# Patient Record
Sex: FEMALE | Race: WHITE | NOT HISPANIC OR LATINO | Employment: PART TIME | URBAN - METROPOLITAN AREA
[De-identification: names, ages, dates, MRNs, and addresses within clinical notes are randomized per-mention and may not be internally consistent; named-entity substitution may affect disease eponyms.]

---

## 2020-07-05 ENCOUNTER — HISTORICAL (OUTPATIENT)
Dept: ADMINISTRATIVE | Facility: HOSPITAL | Age: 40
End: 2020-07-05

## 2020-07-05 LAB — STREP A PCR (OHS): NOT DETECTED

## 2020-09-22 ENCOUNTER — HISTORICAL (OUTPATIENT)
Dept: ADMINISTRATIVE | Facility: HOSPITAL | Age: 40
End: 2020-09-22

## 2020-09-22 LAB
FLUAV AG UPPER RESP QL IA.RAPID: NEGATIVE
FLUBV AG UPPER RESP QL IA.RAPID: NEGATIVE

## 2021-03-30 LAB — SARS-COV-2 RNA RESP QL NAA+PROBE: NEGATIVE

## 2021-06-04 ENCOUNTER — HISTORICAL (OUTPATIENT)
Dept: RADIOLOGY | Facility: HOSPITAL | Age: 41
End: 2021-06-04

## 2021-11-20 ENCOUNTER — HISTORICAL (OUTPATIENT)
Dept: ADMINISTRATIVE | Facility: HOSPITAL | Age: 41
End: 2021-11-20

## 2022-02-04 ENCOUNTER — HISTORICAL (OUTPATIENT)
Dept: ADMINISTRATIVE | Facility: HOSPITAL | Age: 42
End: 2022-02-04

## 2022-04-11 ENCOUNTER — HISTORICAL (OUTPATIENT)
Dept: ADMINISTRATIVE | Facility: HOSPITAL | Age: 42
End: 2022-04-11
Payer: MEDICAID

## 2022-04-27 VITALS
BODY MASS INDEX: 45.99 KG/M2 | DIASTOLIC BLOOD PRESSURE: 97 MMHG | OXYGEN SATURATION: 97 % | WEIGHT: 293 LBS | SYSTOLIC BLOOD PRESSURE: 140 MMHG | HEIGHT: 67 IN

## 2022-06-06 ENCOUNTER — HOSPITAL ENCOUNTER (OUTPATIENT)
Dept: RADIOLOGY | Facility: HOSPITAL | Age: 42
Discharge: HOME | End: 2022-06-06
Attending: NURSE PRACTITIONER
Payer: MEDICAID

## 2022-06-06 DIAGNOSIS — Z12.31 ENCOUNTER FOR SCREENING MAMMOGRAM FOR BREAST CANCER: ICD-10-CM

## 2022-06-06 PROCEDURE — 77063 MAMMO DIGITAL SCREENING BILAT WITH TOMO: ICD-10-PCS | Mod: 26,,, | Performed by: RADIOLOGY

## 2022-06-06 PROCEDURE — 77067 MAMMO DIGITAL SCREENING BILAT WITH TOMO: ICD-10-PCS | Mod: 26,,, | Performed by: RADIOLOGY

## 2022-06-06 PROCEDURE — 77063 BREAST TOMOSYNTHESIS BI: CPT | Mod: 26,,, | Performed by: RADIOLOGY

## 2022-06-06 PROCEDURE — 77067 SCR MAMMO BI INCL CAD: CPT | Mod: TC

## 2022-06-06 PROCEDURE — 77067 SCR MAMMO BI INCL CAD: CPT | Mod: 26,,, | Performed by: RADIOLOGY

## 2022-06-21 ENCOUNTER — CLINICAL SUPPORT (OUTPATIENT)
Dept: AUDIOLOGY | Facility: HOSPITAL | Age: 42
End: 2022-06-21
Payer: MEDICAID

## 2022-06-21 DIAGNOSIS — H93.13 TINNITUS, BILATERAL: ICD-10-CM

## 2022-06-21 DIAGNOSIS — H90.3 SENSORINEURAL HEARING LOSS, BILATERAL: Primary | ICD-10-CM

## 2022-06-21 PROCEDURE — 92567 TYMPANOMETRY: CPT | Performed by: AUDIOLOGIST

## 2022-06-21 PROCEDURE — 92557 COMPREHENSIVE HEARING TEST: CPT | Performed by: AUDIOLOGIST

## 2022-06-21 NOTE — PROGRESS NOTES
Hearing Evaluation        Patient History: Ms. Saldaña reports a gradual decrease in hearing, onset about a year ago. She also reports transient bilateral tinnitus and a history of vestibular migraines.  Middle ear issues are denied at this time.  All additional history is unremarkable.        Test Results:                    Pure Tone Testing:      Right ear:       Mild to moderately severe sensorineural hearing loss from 250-8kHz. Speech reception threshold is in agreement with puretone findings.  Discrimination score of 92% is considered excellent.        Left ear:          Mild to moderate sensorineural hearing loss from 250-8kHz. Speech reception threshold is in agreement with puretone findings.  Discrimination score of 92% is considered excellent.                                                                            Tympanometry:                                           Right ear:       Type 'A' tymp, normal middle ear pressure/function     Left ear:          Type 'A' tymp, normal middle ear pressure/function                                           Interpretations:      Behavioral test findings indicate a mild to moderate SNHL, AS and mild to moderately severe SNHL, AD. Speech reception thresholds obtained at 40dB, AD and 35dB, AS, and are in agreement with puretone findings. Speech discrimination scores of 92%, AU, are considered excellent.  Immittance measures indicate normal middle ear pressure/function, bilaterally.            Recommendations: Annual hearing evaluations are recommended to monitor for progression.  She is not a hearing aid candidate for [place] due to age restriction.

## 2022-09-22 ENCOUNTER — HISTORICAL (OUTPATIENT)
Dept: ADMINISTRATIVE | Facility: HOSPITAL | Age: 42
End: 2022-09-22
Payer: MEDICAID

## 2023-04-02 ENCOUNTER — HOSPITAL ENCOUNTER (EMERGENCY)
Facility: HOSPITAL | Age: 43
Discharge: HOME | End: 2023-04-02
Attending: STUDENT IN AN ORGANIZED HEALTH CARE EDUCATION/TRAINING PROGRAM
Payer: MEDICAID

## 2023-04-02 VITALS
WEIGHT: 293 LBS | OXYGEN SATURATION: 97 % | HEART RATE: 74 BPM | BODY MASS INDEX: 51.8 KG/M2 | RESPIRATION RATE: 12 BRPM | DIASTOLIC BLOOD PRESSURE: 84 MMHG | TEMPERATURE: 99 F | SYSTOLIC BLOOD PRESSURE: 151 MMHG

## 2023-04-02 DIAGNOSIS — K57.92 DIVERTICULITIS: Primary | ICD-10-CM

## 2023-04-02 DIAGNOSIS — R10.32 LEFT LOWER QUADRANT ABDOMINAL PAIN: ICD-10-CM

## 2023-04-02 LAB
ALBUMIN SERPL-MCNC: 3.7 G/DL (ref 3.5–5)
ALBUMIN/GLOB SERPL: 1.2 RATIO (ref 1.1–2)
ALP SERPL-CCNC: 53 UNIT/L (ref 40–150)
ALT SERPL-CCNC: 20 UNIT/L (ref 0–55)
APPEARANCE UR: CLEAR
AST SERPL-CCNC: 16 UNIT/L (ref 5–34)
B-HCG SERPL QL: NEGATIVE
BACTERIA #/AREA URNS AUTO: NORMAL /HPF
BASOPHILS # BLD AUTO: 0.04 X10(3)/MCL (ref 0–0.2)
BASOPHILS NFR BLD AUTO: 0.5 %
BILIRUB UR QL STRIP.AUTO: NEGATIVE MG/DL
BILIRUBIN DIRECT+TOT PNL SERPL-MCNC: 0.7 MG/DL
BUN SERPL-MCNC: 9.6 MG/DL (ref 7–18.7)
CALCIUM SERPL-MCNC: 9.7 MG/DL (ref 8.4–10.2)
CHLORIDE SERPL-SCNC: 109 MMOL/L (ref 98–107)
CO2 SERPL-SCNC: 25 MMOL/L (ref 22–29)
COLOR UR AUTO: YELLOW
CREAT SERPL-MCNC: 0.76 MG/DL (ref 0.55–1.02)
EOSINOPHIL # BLD AUTO: 0.42 X10(3)/MCL (ref 0–0.9)
EOSINOPHIL NFR BLD AUTO: 4.8 %
ERYTHROCYTE [DISTWIDTH] IN BLOOD BY AUTOMATED COUNT: 13 % (ref 11.5–17)
GFR SERPLBLD CREATININE-BSD FMLA CKD-EPI: >60 MLS/MIN/1.73/M2
GLOBULIN SER-MCNC: 3.2 GM/DL (ref 2.4–3.5)
GLUCOSE SERPL-MCNC: 93 MG/DL (ref 74–100)
GLUCOSE UR QL STRIP.AUTO: NEGATIVE MG/DL
HCT VFR BLD AUTO: 45.1 % (ref 37–47)
HGB BLD-MCNC: 14.8 G/DL (ref 12–16)
IMM GRANULOCYTES # BLD AUTO: 0.03 X10(3)/MCL (ref 0–0.04)
IMM GRANULOCYTES NFR BLD AUTO: 0.3 %
KETONES UR QL STRIP.AUTO: NEGATIVE MG/DL
LEUKOCYTE ESTERASE UR QL STRIP.AUTO: NEGATIVE UNIT/L
LIPASE SERPL-CCNC: 10 U/L
LYMPHOCYTES # BLD AUTO: 3.14 X10(3)/MCL (ref 0.6–4.6)
LYMPHOCYTES NFR BLD AUTO: 36 %
MCH RBC QN AUTO: 29.4 PG (ref 27–31)
MCHC RBC AUTO-ENTMCNC: 32.8 G/DL (ref 33–36)
MCV RBC AUTO: 89.7 FL (ref 80–94)
MONOCYTES # BLD AUTO: 0.58 X10(3)/MCL (ref 0.1–1.3)
MONOCYTES NFR BLD AUTO: 6.7 %
NEUTROPHILS # BLD AUTO: 4.51 X10(3)/MCL (ref 2.1–9.2)
NEUTROPHILS NFR BLD AUTO: 51.7 %
NITRITE UR QL STRIP.AUTO: NEGATIVE
NRBC BLD AUTO-RTO: 0 %
PH UR STRIP.AUTO: 6.5 [PH]
PLATELET # BLD AUTO: 283 X10(3)/MCL (ref 130–400)
PMV BLD AUTO: 9.5 FL (ref 7.4–10.4)
POTASSIUM SERPL-SCNC: 4.3 MMOL/L (ref 3.5–5.1)
PROT SERPL-MCNC: 6.9 GM/DL (ref 6.4–8.3)
PROT UR QL STRIP.AUTO: NEGATIVE MG/DL
RBC # BLD AUTO: 5.03 X10(6)/MCL (ref 4.2–5.4)
RBC #/AREA URNS AUTO: <5 /HPF
RBC UR QL AUTO: NEGATIVE UNIT/L
SODIUM SERPL-SCNC: 139 MMOL/L (ref 136–145)
SP GR UR STRIP.AUTO: 1.02 (ref 1–1.03)
SQUAMOUS #/AREA URNS AUTO: <5 /HPF
UROBILINOGEN UR STRIP-ACNC: 1 MG/DL
WBC # SPEC AUTO: 8.7 X10(3)/MCL (ref 4.5–11.5)
WBC #/AREA URNS AUTO: <5 /HPF

## 2023-04-02 PROCEDURE — 81001 URINALYSIS AUTO W/SCOPE: CPT | Performed by: NURSE PRACTITIONER

## 2023-04-02 PROCEDURE — 80053 COMPREHEN METABOLIC PANEL: CPT | Performed by: NURSE PRACTITIONER

## 2023-04-02 PROCEDURE — 25000003 PHARM REV CODE 250: Performed by: STUDENT IN AN ORGANIZED HEALTH CARE EDUCATION/TRAINING PROGRAM

## 2023-04-02 PROCEDURE — 25500020 PHARM REV CODE 255: Performed by: STUDENT IN AN ORGANIZED HEALTH CARE EDUCATION/TRAINING PROGRAM

## 2023-04-02 PROCEDURE — 96360 HYDRATION IV INFUSION INIT: CPT | Mod: 59

## 2023-04-02 PROCEDURE — 99285 EMERGENCY DEPT VISIT HI MDM: CPT | Mod: 25

## 2023-04-02 PROCEDURE — 63600175 PHARM REV CODE 636 W HCPCS: Performed by: STUDENT IN AN ORGANIZED HEALTH CARE EDUCATION/TRAINING PROGRAM

## 2023-04-02 PROCEDURE — 83690 ASSAY OF LIPASE: CPT | Performed by: NURSE PRACTITIONER

## 2023-04-02 PROCEDURE — 81025 URINE PREGNANCY TEST: CPT | Performed by: NURSE PRACTITIONER

## 2023-04-02 PROCEDURE — 85025 COMPLETE CBC W/AUTO DIFF WBC: CPT | Performed by: NURSE PRACTITIONER

## 2023-04-02 RX ORDER — AMOXICILLIN AND CLAVULANATE POTASSIUM 875; 125 MG/1; MG/1
1 TABLET, FILM COATED ORAL 2 TIMES DAILY
Qty: 14 TABLET | Refills: 0 | Status: SHIPPED | OUTPATIENT
Start: 2023-04-02

## 2023-04-02 RX ORDER — HYDROCODONE BITARTRATE AND ACETAMINOPHEN 5; 325 MG/1; MG/1
1 TABLET ORAL EVERY 8 HOURS PRN
Qty: 13 TABLET | Refills: 0 | Status: SHIPPED | OUTPATIENT
Start: 2023-04-02

## 2023-04-02 RX ORDER — ONDANSETRON 4 MG/1
4 TABLET, ORALLY DISINTEGRATING ORAL
Status: COMPLETED | OUTPATIENT
Start: 2023-04-02 | End: 2023-04-02

## 2023-04-02 RX ORDER — AMOXICILLIN AND CLAVULANATE POTASSIUM 875; 125 MG/1; MG/1
1 TABLET, FILM COATED ORAL
Status: COMPLETED | OUTPATIENT
Start: 2023-04-02 | End: 2023-04-02

## 2023-04-02 RX ORDER — HYDROCODONE BITARTRATE AND ACETAMINOPHEN 5; 325 MG/1; MG/1
1 TABLET ORAL
Status: COMPLETED | OUTPATIENT
Start: 2023-04-02 | End: 2023-04-02

## 2023-04-02 RX ORDER — ONDANSETRON 4 MG/1
4 TABLET, ORALLY DISINTEGRATING ORAL EVERY 8 HOURS PRN
Qty: 12 TABLET | Refills: 0 | Status: SHIPPED | OUTPATIENT
Start: 2023-04-02

## 2023-04-02 RX ADMIN — AMOXICILLIN AND CLAVULANATE POTASSIUM 1 TABLET: 875; 125 TABLET, FILM COATED ORAL at 07:04

## 2023-04-02 RX ADMIN — HYDROCODONE BITARTRATE AND ACETAMINOPHEN 1 TABLET: 5; 325 TABLET ORAL at 07:04

## 2023-04-02 RX ADMIN — SODIUM CHLORIDE, POTASSIUM CHLORIDE, SODIUM LACTATE AND CALCIUM CHLORIDE 1000 ML: 600; 310; 30; 20 INJECTION, SOLUTION INTRAVENOUS at 04:04

## 2023-04-02 RX ADMIN — ONDANSETRON 4 MG: 4 TABLET, ORALLY DISINTEGRATING ORAL at 07:04

## 2023-04-02 RX ADMIN — IOPAMIDOL 100 ML: 755 INJECTION, SOLUTION INTRAVENOUS at 04:04

## 2023-04-02 NOTE — FIRST PROVIDER EVALUATION
Medical screening examination initiated.  I have conducted a focused provider triage encounter, findings are as follows:    Brief history of present illness:  Patient states left lower abdominal pain and nausea.     Vitals:    04/02/23 1325 04/02/23 1326   BP:  129/71   Pulse: 86    Resp: 16    Temp: 98.7 °F (37.1 °C)    TempSrc: Oral    SpO2: 97%    Weight: (!) 149.7 kg (330 lb)        Pertinent physical exam:  Awake, alert, ambulatory      Brief workup plan:  LAbs    Preliminary workup initiated; this workup will be continued and followed by the physician or advanced practice provider that is assigned to the patient when roomed.

## 2023-04-02 NOTE — ED NOTES
Pt with no s/s of resp or other distress noted; pt attached to all monitors; will continue to monitor

## 2023-04-02 NOTE — ED PROVIDER NOTES
Encounter Date: 4/2/2023    SCRIBE #1 NOTE: I, Deanna Belcher, am scribing for, and in the presence of,  Saul Smith MD. I have scribed the following portions of the note - Other sections scribed: HPI, ROS, PE.     History     Chief Complaint   Patient presents with    Abdominal Pain    Nausea     Pt presents c/o LLQ abd pain with nausea. Onset x 3 days.  PMH diverticulitis.       41 y/o female with hx of hysterectomy, diverticulitis, and lupus presents to the ED c/o LLQ abdominal pain and nausea that onset 3 days ago. Pt reports her as abdominal pain as sporadic and random and does not radiate. Pt reports the pain occurs sometimes when moving. Pt reports not being able to sleep last night due to pain. Pt reports this pain has occurred before, patient was diagnosed with diverticulitis at that time and was discharged with antibiotics. Note pt [facility] and [facility] referred her to come here. Pt reports flatulence and normal bowel movements. Pt denies blood in stool, fever, chills, chest pain, vomiting, and constipation.     The history is provided by the patient and medical records. No  was used.   Abdominal Pain  The current episode started several days ago (3 days.). The onset of the illness was gradual. The problem has not changed since onset.The abdominal pain is located in the LLQ. The abdominal pain does not radiate. The abdominal pain is relieved by movement and certain positions. The other symptoms of the illness include nausea. The other symptoms of the illness do not include fever or vomiting.   Nausea began 3 to 5 days ago. The nausea is exacerbated by motion and activity.   The patient states that she believes she is currently not pregnant. The patient has not had a change in bowel habit. Symptoms associated with the illness do not include chills. Significant associated medical issues include diverticulitis.   Review of patient's allergies indicates:   Allergen  Reactions    Latex, natural rubber Hives, Itching and Rash    Codeine     Tramadol      Other reaction(s): Not Indicated     No past medical history on file.  No past surgical history on file.  No family history on file.     Review of Systems   Constitutional:  Negative for chills and fever.   Gastrointestinal:  Positive for abdominal pain and nausea. Negative for vomiting.        Flatulence. Normal bowel movements.      Physical Exam     Initial Vitals   BP Pulse Resp Temp SpO2   04/02/23 1326 04/02/23 1325 04/02/23 1325 04/02/23 1325 04/02/23 1325   129/71 86 16 98.7 °F (37.1 °C) 97 %      MAP       --                Physical Exam    Nursing note and vitals reviewed.  Constitutional: She appears well-developed and well-nourished. She is not diaphoretic. No distress.   Limited by body habitus.    HENT:   Head: Normocephalic and atraumatic.   Right Ear: External ear normal.   Left Ear: External ear normal.   Nose: Nose normal.   Eyes: Conjunctivae and EOM are normal. Pupils are equal, round, and reactive to light. Right eye exhibits no discharge. Left eye exhibits no discharge.   Cardiovascular:  Normal rate, regular rhythm, normal heart sounds and intact distal pulses.     Exam reveals no gallop and no friction rub.       No murmur heard.  Pulmonary/Chest: Breath sounds normal. No respiratory distress. She has no wheezes. She has no rhonchi. She has no rales. She exhibits no tenderness.   Abdominal: Abdomen is soft. Bowel sounds are normal. She exhibits no distension and no mass. There is abdominal tenderness (left lower quadrant). There is no rebound and no guarding.   Musculoskeletal:         General: No edema. Normal range of motion.     Neurological: She is alert and oriented to person, place, and time. No cranial nerve deficit or sensory deficit.   Skin: Skin is warm and dry. Capillary refill takes less than 2 seconds. No erythema. No pallor.       ED Course   Procedures  Labs Reviewed   COMPREHENSIVE  METABOLIC PANEL - Abnormal; Notable for the following components:       Result Value    Chloride 109 (*)     All other components within normal limits   CBC WITH DIFFERENTIAL - Abnormal; Notable for the following components:    MCHC 32.8 (*)     All other components within normal limits   HCG QUALITATIVE URINE - Normal   URINALYSIS, REFLEX TO URINE CULTURE - Normal   LIPASE - Normal   URINALYSIS, MICROSCOPIC - Normal   CBC W/ AUTO DIFFERENTIAL    Narrative:     The following orders were created for panel order CBC Auto Differential.  Procedure                               Abnormality         Status                     ---------                               -----------         ------                     CBC with Differential[[id number]]        Abnormal            Final result                 Please view results for these tests on the individual orders.          Imaging Results              CT Abdomen Pelvis With Contrast (Final result)  Result time 04/02/23 16:48:20      Final result by George De Leon MD (04/02/23 16:48:20)                   Impression:      Diverticulitis descending colon with no gross perforation or drainable abscess.      Electronically signed by: George De Leon  Date:    04/02/2023  Time:    16:48               Narrative:    EXAMINATION:  CT ABDOMEN PELVIS WITH CONTRAST    CLINICAL HISTORY:  LLQ abdominal pain;    TECHNIQUE:  Helical acquisition through the abdomen and pelvis with IV contrast.  Three plane reconstructions were provided for review. DLP 1936 mGycm. Automatic exposure control, adjustment of mA/kV or iterative reconstruction technique was used to reduce radiation.    COMPARISON:  6 December 2021    FINDINGS:  The limited imaged lung bases are clear.    There is hepatic steatosis.  No significant abnormality gallbladder, spleen, pancreas or adrenals.  No hydronephrosis or suspicious renal lesion.    No bowel obstruction.  There is colonic diverticulosis with inflammatory changes along  the descending colon.  There is no gross perforation or drainable abscess.    Urinary bladder unremarkable.  No pelvic free fluid.  Uterus surgically absent.  Abdominal aorta normal in caliber.    Mild degenerative change of the spine.                                       Medications   lactated ringers bolus 1,000 mL (0 mLs Intravenous Stopped 4/2/23 1731)   iopamidoL (ISOVUE-370) injection 100 mL (100 mLs Intravenous Given 4/2/23 1635)   HYDROcodone-acetaminophen 5-325 mg per tablet 1 tablet (1 tablet Oral Given 4/2/23 1923)   ondansetron disintegrating tablet 4 mg (4 mg Oral Given 4/2/23 1923)   amoxicillin-clavulanate 875-125mg per tablet 1 tablet (1 tablet Oral Given 4/2/23 1922)                 Medical Decision Making  Patient presents with left lower quadrant pain, history of diverticulitis reports this feels the same.    Differential diagnosis includes but is not limited to appendicitis, biliary disease, diverticulitis,  AAA, ACS, mesenteric ischemia, intraabdominal abcess, retroperitoneal abcess, gastritis, gastroenteritis, hepatitis, hernia, pancreatitis, inflammatory bowel disease, PUD, SBP, nephrolithiasis, DKA, sickle cell crisis, consitpation, GERD, IBS     Patient's labs are unremarkable.  No leukocytosis no significant other abnormality.  She is offered CT or empiric treatment, she would prefer the CT.  CT scan does indeed show once again diverticulitis.  Patient otherwise awake alert requesting discharge home.  Will discharge home with the appropriate pain medications, Augmentin.  Return precautions given.  Questions invited, questions answered to the best my ability.  Patient discharged home condition stable.      Amount and/or Complexity of Data Reviewed  External Data Reviewed: notes.     Details: See ED course  Labs: ordered. Decision-making details documented in ED Course.  Radiology: ordered and independent interpretation performed.     Details: Haziness and stranding in lower abdomen, left  side, concerning for infection, possible diverticulitis.    Risk  Prescription drug management.          Scribe Attestation:   Scribe #1: I performed the above scribed service and the documentation accurately describes the services I performed. I attest to the accuracy of the note.    Attending Attestation:           Physician Attestation for Scribe:  Physician Attestation Statement for Scribe #1: I, Saul Smith MD, reviewed documentation, as scribed by Deanna Belcher in my presence, and it is both accurate and complete.           ED Course as of 04/03/23 0223   Sun Apr 02, 2023   1502 Chart review reveals CT scan from 12/06/2021 showed uncomplicated diverticulitis. [MM]   1600 Comprehensive Metabolic Panel(!)  Mildly elevated chloride 109, no other electrolyte abnormality.  No evidence of renal dysfunction. [MM]   1600 Urinalysis, Reflex to Urine Culture  Negative for urinary tract infection. [MM]   1601 CBC Auto Differential(!)  No leukocytosis no anemia. [MM]   Mon Apr 03, 2023   0223 Chart review reveals visit on 12/06/2021 patient was diagnosed with diverticulitis. [MM]      ED Course User Index  [MM] Saul Smith MD                 Clinical Impression:   Final diagnoses:  [K57.92] Diverticulitis (Primary)  [R10.32] Left lower quadrant abdominal pain        ED Disposition Condition    Discharge Stable          ED Prescriptions       Medication Sig Dispense Start Date End Date Auth. Provider    amoxicillin-clavulanate 875-125mg (AUGMENTIN) 875-125 mg per tablet Take 1 tablet by mouth 2 (two) times daily. 14 tablet 4/2/2023 -- Saul Smith MD    HYDROcodone-acetaminophen (NORCO) 5-325 mg per tablet Take 1 tablet by mouth every 8 (eight) hours as needed for Pain. 13 tablet 4/2/2023 -- Saul Smith MD    ondansetron (ZOFRAN-ODT) 4 MG TbDL Take 1 tablet (4 mg total) by mouth every 8 (eight) hours as needed (nausea vomiting). 12 tablet 4/2/2023 -- Saul Smith MD          Follow-up  Information       Follow up With Specialties Details Why Contact Info    Tj Lares MD Obstetrics, Obstetrics and Gynecology Call   [address] [facility] [id number] [phone]               Saul Smith MD  04/03/23 7204

## 2023-04-02 NOTE — Clinical Note
"Yoli Bansal" Piotr was seen and treated in our emergency department on 4/2/2023.  She may return to work on 04/05/2023.       If you have any questions or concerns, please don't hesitate to call.      Saul Smith MD"

## 2023-04-03 NOTE — DISCHARGE INSTRUCTIONS
Thanks for letting us take care of you today!  It is our goal to give you courteous care and to keep you comfortable and informed, if you have any questions before you leave I will be happy to try and answer them.    Here is some advice after your visit:    Your visit in the emergency department is NOT definitive care - please follow-up with your primary care doctor and/or specialist within 1-2 days. Please return to the emergency department if you develop worsening symptoms including: fever, chills, chest pain, shortness of breath, weakness, numbness, tingling, nausea, vomiting, inability to eat, drink, or take your medication. Please return if you have any worsening in your condition or if you have any other concerns.    If you had radiology exams like an XRAY or CT in the emergency Department the interpreation on them may be preliminary - there may be less time sensitive findings on the reports please obtain these reports within 24 hours from the hospital or by using your out on your mobile phone to access records.  Bring these to your primary care doctor and/or specialist for further review of incidental findings.    Please review any LAB WORK from your visit today with your primary care physician.    Please take all antibiotics as prescribed.      Please return emergency department worsening symptoms, fever, chills, nausea, vomiting, inability to eat, drink, take your medication.

## 2023-11-30 ENCOUNTER — ON-DEMAND VIRTUAL (OUTPATIENT)
Dept: URGENT CARE | Facility: CLINIC | Age: 43
End: 2023-11-30
Payer: MEDICAID

## 2023-11-30 DIAGNOSIS — J01.00 ACUTE NON-RECURRENT MAXILLARY SINUSITIS: Primary | ICD-10-CM

## 2023-11-30 PROCEDURE — 99213 PR OFFICE/OUTPT VISIT, EST, LEVL III, 20-29 MIN: ICD-10-PCS | Mod: 95,,, | Performed by: FAMILY MEDICINE

## 2023-11-30 PROCEDURE — 99213 OFFICE O/P EST LOW 20 MIN: CPT | Mod: 95,,, | Performed by: FAMILY MEDICINE

## 2023-11-30 RX ORDER — AMOXICILLIN AND CLAVULANATE POTASSIUM 875; 125 MG/1; MG/1
1 TABLET, FILM COATED ORAL EVERY 12 HOURS
Qty: 20 TABLET | Refills: 0 | Status: SHIPPED | OUTPATIENT
Start: 2023-11-30 | End: 2023-12-10

## 2023-11-30 NOTE — PROGRESS NOTES
Subjective:      Patient ID: Yoli Saldaña is a 43 y.o. female.    Vitals:  vitals were not taken for this visit.     Chief Complaint: Nasal Congestion (1o days)      Visit Type: TELE AUDIOVISUAL    Present with the patient at the time of consultation: TELEMED PRESENT WITH PATIENT: None    History reviewed. No pertinent past medical history.  History reviewed. No pertinent surgical history.  Review of patient's allergies indicates:   Allergen Reactions    Latex, natural rubber Hives, Itching and Rash    Codeine     Tramadol      Other reaction(s): Not Indicated     Current Outpatient Medications on File Prior to Visit   Medication Sig Dispense Refill    amoxicillin-clavulanate 875-125mg (AUGMENTIN) 875-125 mg per tablet Take 1 tablet by mouth 2 (two) times daily. 14 tablet 0    HYDROcodone-acetaminophen (NORCO) 5-325 mg per tablet Take 1 tablet by mouth every 8 (eight) hours as needed for Pain. 13 tablet 0    ondansetron (ZOFRAN-ODT) 4 MG TbDL Take 1 tablet (4 mg total) by mouth every 8 (eight) hours as needed (nausea vomiting). 12 tablet 0     No current facility-administered medications on file prior to visit.     History reviewed. No pertinent family history.    Medications Ordered                [id number] [address] & [address] [address]    Telephone: [phone]   Fax: [phone]   Hours: Open 24 hours                         E-Prescribed (1 of 1)              amoxicillin-clavulanate 875-125mg (AUGMENTIN) 875-125 mg per tablet    Sig: Take 1 tablet by mouth every 12 (twelve) hours. for 10 days       Start: 11/30/23     Quantity: 20 tablet Refills: 0                           Ohs Peq Odvv Intake    11/30/2023  5:24 PM CST - Filed by Patient   Describe your reason for todays visit Chest and sinus congestion for over a week   What is your current physical address in the event of a medical emergency?    Are  you able to take your vital signs? No   Please attach any relevant images or files          Chest and sinus congestion for over a week.Patient has a history of a sinus infection a few times per year, no sinus surgery, Augmentin 10-day works well for her, currently she is taking Flonase only, no fever.  Discolored nasal discharge and getting worse in past 2 to 3 days, no facial swelling or redness, patient sounds congested        Constitution: Negative for fever.   HENT:  Positive for sinus pain and sinus pressure. Negative for facial swelling.    Respiratory:  Positive for cough.         Objective:   The physical exam was conducted virtually.  Physical Exam   Constitutional: She is oriented to person, place, and time.   HENT:   Head: Normocephalic and atraumatic.   Nose: Rhinorrhea and congestion present. Right sinus exhibits maxillary sinus tenderness and frontal sinus tenderness.   Eyes: Conjunctivae are normal.   Pulmonary/Chest: Effort normal. No respiratory distress.   Abdominal: Normal appearance.   Neurological: no focal deficit. She is alert and oriented to person, place, and time.   Skin: Skin is no rash.   Psychiatric: Mood, judgment and thought content normal.       Assessment:     1. Acute non-recurrent maxillary sinusitis        Plan:       Acute non-recurrent maxillary sinusitis    Other orders  -     amoxicillin-clavulanate 875-125mg (AUGMENTIN) 875-125 mg per tablet; Take 1 tablet by mouth every 12 (twelve) hours. for 10 days  Dispense: 20 tablet; Refill: 0

## 2024-01-03 ENCOUNTER — ON-DEMAND VIRTUAL (OUTPATIENT)
Dept: URGENT CARE | Facility: CLINIC | Age: 44
End: 2024-01-03
Payer: MEDICAID

## 2024-01-03 DIAGNOSIS — B34.9 VIRAL SYNDROME: Primary | ICD-10-CM

## 2024-01-03 PROCEDURE — 99212 OFFICE O/P EST SF 10 MIN: CPT | Mod: 95,,, | Performed by: FAMILY MEDICINE

## 2024-01-03 RX ORDER — OSELTAMIVIR PHOSPHATE 75 MG/1
75 CAPSULE ORAL 2 TIMES DAILY
Qty: 10 CAPSULE | Refills: 0 | Status: SHIPPED | OUTPATIENT
Start: 2024-01-03 | End: 2024-01-08

## 2024-01-03 NOTE — PROGRESS NOTES
Subjective:      Patient ID: Yoli Saldaña is a 43 y.o. female.    Vitals:  vitals were not taken for this visit.     Chief Complaint: Cough (Cough fever)      Visit Type: TELE AUDIOVISUAL    Present with the patient at the time of consultation: TELEMED PRESENT WITH PATIENT: None    No past medical history on file.  No past surgical history on file.  Review of patient's allergies indicates:   Allergen Reactions    Latex, natural rubber Hives, Itching and Rash    Codeine     Tramadol      Other reaction(s): Not Indicated     Current Outpatient Medications on File Prior to Visit   Medication Sig Dispense Refill    amoxicillin-clavulanate 875-125mg (AUGMENTIN) 875-125 mg per tablet Take 1 tablet by mouth 2 (two) times daily. 14 tablet 0    HYDROcodone-acetaminophen (NORCO) 5-325 mg per tablet Take 1 tablet by mouth every 8 (eight) hours as needed for Pain. 13 tablet 0    ondansetron (ZOFRAN-ODT) 4 MG TbDL Take 1 tablet (4 mg total) by mouth every 8 (eight) hours as needed (nausea vomiting). 12 tablet 0     No current facility-administered medications on file prior to visit.     No family history on file.        Ohs Peq Odvv Intake    1/3/2024  7:25 AM CST - Filed by Patient   Describe your reason for todays visit Fever, 102.4, (99.7 1.5 hours after Tylenol and ibuprofen)chills, cough, body aches   What is your current physical address in the event of a medical emergency? [address] [place] [address]   Are you able to take your vital signs? No   Please attach any relevant images or files          44 yo female with symptoms of cough congestion myalgias fever  Was put on antibiotics in the past 30 days for a sinus infection  New symptom onset was this morning  Tested negative for COVID this morning.    Cough  This is a new problem. The current episode started today. Associated symptoms include chills and a fever.       Constitution: Positive for chills, fatigue and fever.   Respiratory:  Positive for cough.          Objective:   The physical exam was conducted virtually.  Physical Exam   Constitutional: She does not appear ill. No distress.   HENT:   Head: Normocephalic and atraumatic.   Pulmonary/Chest: Effort normal. No respiratory distress.       Assessment:     1. Viral syndrome        Plan:       Viral syndrome      Please take your medicine with food.

## 2024-01-03 NOTE — LETTER
"               Yoli Bansal" Piotr was seen and treated in our emergency department on 1/3/2024.  She may return to work on 1/6.    If you have any questions or concerns, please don't hesitate to call.    Sharri Londono"

## 2024-07-06 ENCOUNTER — ON-DEMAND VIRTUAL (OUTPATIENT)
Dept: URGENT CARE | Facility: CLINIC | Age: 44
End: 2024-07-06
Payer: COMMERCIAL

## 2024-07-06 VITALS — TEMPERATURE: 103 F

## 2024-07-06 DIAGNOSIS — B34.9 VIRAL SYNDROME: Primary | ICD-10-CM

## 2024-07-06 PROCEDURE — 99212 OFFICE O/P EST SF 10 MIN: CPT | Mod: 95,,, | Performed by: FAMILY MEDICINE

## 2024-07-06 RX ORDER — BENZONATATE 100 MG/1
100 CAPSULE ORAL 3 TIMES DAILY PRN
Qty: 20 CAPSULE | Refills: 0 | Status: ON HOLD | OUTPATIENT
Start: 2024-07-06 | End: 2024-07-10

## 2024-07-06 NOTE — PROGRESS NOTES
Subjective:      Patient ID: Yoli Saldaña is a 43 y.o. female.    Vitals:  temperature is 102.5 °F (39.2 °C) (abnormal).     Chief Complaint: Fever (Fever body aches)      Visit Type: TELE AUDIOVISUAL    Present with the patient at the time of consultation: TELEMED PRESENT WITH PATIENT: None    No past medical history on file.  No past surgical history on file.  Review of patient's allergies indicates:   Allergen Reactions    Latex, natural rubber Hives, Itching and Rash    Codeine     Tramadol      Other reaction(s): Not Indicated     Current Outpatient Medications on File Prior to Visit   Medication Sig Dispense Refill    amoxicillin-clavulanate 875-125mg (AUGMENTIN) 875-125 mg per tablet Take 1 tablet by mouth 2 (two) times daily. 14 tablet 0    HYDROcodone-acetaminophen (NORCO) 5-325 mg per tablet Take 1 tablet by mouth every 8 (eight) hours as needed for Pain. 13 tablet 0    ondansetron (ZOFRAN-ODT) 4 MG TbDL Take 1 tablet (4 mg total) by mouth every 8 (eight) hours as needed (nausea vomiting). 12 tablet 0     No current facility-administered medications on file prior to visit.     No family history on file.    Medications Ordered                [id number] [place] [address] [address] [facility] [address]   [address] [facility] [address]    Telephone: [phone]   Fax: [phone]   Hours: Not open 24 hours                         E-Prescribed (1 of 1)              benzonatate (TESSALON PERLES) 100 MG capsule    Sig: Take 1 capsule (100 mg total) by mouth 3 (three) times daily as needed for Cough.       Start: 7/6/24     Quantity: 20 capsule Refills: 0                           Ohs Peq Odvv Intake    7/6/2024  3:49 PM CDT - Filed by Patient   What is your current physical address in the event of a medical emergency? [address] [place] [address]   Are you able to take your vital signs? No   Please attach any relevant images or  files          44 yo female with symptom onset on the 4th of July (2 days ago) with fever, chills cough, body aches.  Tested negative for COVID x 2.  Patient located at home for her visit.    Fever   This is a new problem. The current episode started 2 days ago. Associated symptoms include coughing.       Constitution: Positive for fever.   Respiratory:  Positive for cough.         Objective:   The physical exam was conducted virtually.  Physical Exam   Constitutional: She is oriented to person, place, and time. She appears ill. No distress.   Pulmonary/Chest: Effort normal. No respiratory distress.   Neurological: She is alert and oriented to person, place, and time.       Assessment:     1. Viral syndrome        Plan:       Viral syndrome  -     benzonatate (TESSALON PERLES) 100 MG capsule; Take 1 capsule (100 mg total) by mouth 3 (three) times daily as needed for Cough.  Dispense: 20 capsule; Refill: 0      Please take over the counter cough and cold remedies.

## 2024-07-06 NOTE — LETTER
July 6, 2024    Yoli Saldaña  [address] [facility] [address]             Virtual Visit - Urgent Care  Urgent Care  [address] [facility] [address]   July 6, 2024     Patient: Yoli Saldaña   YOB: 1980   Date of Visit: 7/6/2024       To Whom it May Concern:    Yoli Saldaña was seen virtually on 7/6/2024. She may return to work on 7/8 .    Please excuse her from any classes or work missed.    If you have any questions or concerns, please don't hesitate to call.    Sincerely,         Sharri Londono MD

## 2024-07-07 ENCOUNTER — HOSPITAL ENCOUNTER (INPATIENT)
Facility: HOSPITAL | Age: 44
LOS: 3 days | Discharge: HOME | DRG: 193 | End: 2024-07-10
Attending: INTERNAL MEDICINE | Admitting: INTERNAL MEDICINE
Payer: COMMERCIAL

## 2024-07-07 DIAGNOSIS — R73.03 PREDIABETES: ICD-10-CM

## 2024-07-07 DIAGNOSIS — R06.02 SOB (SHORTNESS OF BREATH): ICD-10-CM

## 2024-07-07 DIAGNOSIS — R06.02 SHORTNESS OF BREATH: ICD-10-CM

## 2024-07-07 DIAGNOSIS — J18.9 PNEUMONIA OF LEFT LUNG DUE TO INFECTIOUS ORGANISM, UNSPECIFIED PART OF LUNG: Primary | ICD-10-CM

## 2024-07-07 DIAGNOSIS — B34.9 VIRAL SYNDROME: ICD-10-CM

## 2024-07-07 LAB
ALBUMIN SERPL-MCNC: 3.8 G/DL (ref 3.5–5)
ALBUMIN/GLOB SERPL: 1.2 RATIO (ref 1.1–2)
ALLENS TEST BLOOD GAS (OHS): YES
ALP SERPL-CCNC: 54 UNIT/L (ref 40–150)
ALT SERPL-CCNC: 18 UNIT/L (ref 0–55)
ANION GAP SERPL CALC-SCNC: 12 MEQ/L
AST SERPL-CCNC: 19 UNIT/L (ref 5–34)
B PERT.PT PRMT NPH QL NAA+NON-PROBE: NOT DETECTED
BASE EXCESS BLD CALC-SCNC: -1 MMOL/L (ref -2–2)
BASOPHILS # BLD AUTO: 0.03 X10(3)/MCL
BASOPHILS NFR BLD AUTO: 0.3 %
BILIRUB SERPL-MCNC: 0.5 MG/DL
BLOOD GAS SAMPLE TYPE (OHS): ABNORMAL
BNP BLD-MCNC: <10 PG/ML
BUN SERPL-MCNC: 13.3 MG/DL (ref 7–18.7)
C PNEUM DNA NPH QL NAA+NON-PROBE: NOT DETECTED
CA-I BLD-SCNC: 1.07 MMOL/L (ref 1.12–1.23)
CALCIUM SERPL-MCNC: 9.2 MG/DL (ref 8.4–10.2)
CHLORIDE SERPL-SCNC: 103 MMOL/L (ref 98–107)
CO2 BLDA-SCNC: 23.4 MMOL/L
CO2 SERPL-SCNC: 19 MMOL/L (ref 22–29)
COHGB MFR BLDA: 2.3 % (ref 0.5–1.5)
CREAT SERPL-MCNC: 1.12 MG/DL (ref 0.55–1.02)
CREAT/UREA NIT SERPL: 12
DRAWN BY BLOOD GAS (OHS): ABNORMAL
EOSINOPHIL # BLD AUTO: 0.04 X10(3)/MCL (ref 0–0.9)
EOSINOPHIL NFR BLD AUTO: 0.4 %
ERYTHROCYTE [DISTWIDTH] IN BLOOD BY AUTOMATED COUNT: 13.2 % (ref 11.5–17)
FLUAV AG UPPER RESP QL IA.RAPID: NOT DETECTED
FLUBV AG UPPER RESP QL IA.RAPID: NOT DETECTED
GFR SERPLBLD CREATININE-BSD FMLA CKD-EPI: >60 ML/MIN/1.73/M2
GLOBULIN SER-MCNC: 3.1 GM/DL (ref 2.4–3.5)
GLUCOSE SERPL-MCNC: 124 MG/DL (ref 74–100)
HADV DNA NPH QL NAA+NON-PROBE: NOT DETECTED
HCO3 BLDA-SCNC: 22.4 MMOL/L (ref 22–26)
HCOV 229E RNA NPH QL NAA+NON-PROBE: NOT DETECTED
HCOV HKU1 RNA NPH QL NAA+NON-PROBE: NOT DETECTED
HCOV NL63 RNA NPH QL NAA+NON-PROBE: NOT DETECTED
HCOV OC43 RNA NPH QL NAA+NON-PROBE: NOT DETECTED
HCT VFR BLD AUTO: 45.5 % (ref 37–47)
HGB BLD-MCNC: 15.3 G/DL (ref 12–16)
HMPV RNA NPH QL NAA+NON-PROBE: NOT DETECTED
HPIV1 RNA NPH QL NAA+NON-PROBE: NOT DETECTED
HPIV2 RNA NPH QL NAA+NON-PROBE: NOT DETECTED
HPIV3 RNA NPH QL NAA+NON-PROBE: NOT DETECTED
HPIV4 RNA NPH QL NAA+NON-PROBE: NOT DETECTED
IMM GRANULOCYTES # BLD AUTO: 0.04 X10(3)/MCL (ref 0–0.04)
IMM GRANULOCYTES NFR BLD AUTO: 0.4 %
LACTATE SERPL-SCNC: 1.4 MMOL/L (ref 0.5–2.2)
LPM (OHS): 3
LYMPHOCYTES # BLD AUTO: 1.85 X10(3)/MCL (ref 0.6–4.6)
LYMPHOCYTES NFR BLD AUTO: 17.4 %
M PNEUMO DNA NPH QL NAA+NON-PROBE: NOT DETECTED
MCH RBC QN AUTO: 29.1 PG (ref 27–31)
MCHC RBC AUTO-ENTMCNC: 33.6 G/DL (ref 33–36)
MCV RBC AUTO: 86.5 FL (ref 80–94)
METHGB MFR BLDA: 1.3 % (ref 0.4–1.5)
MONOCYTES # BLD AUTO: 1.26 X10(3)/MCL (ref 0.1–1.3)
MONOCYTES NFR BLD AUTO: 11.9 %
NEUTROPHILS # BLD AUTO: 7.41 X10(3)/MCL (ref 2.1–9.2)
NEUTROPHILS NFR BLD AUTO: 69.6 %
NRBC BLD AUTO-RTO: 0 %
O2 HB BLOOD GAS (OHS): 91.8 % (ref 94–97)
OHS QRS DURATION: 80 MS
OHS QTC CALCULATION: 427 MS
OXYGEN DEVICE BLOOD GAS (OHS): ABNORMAL
OXYHGB MFR BLDA: 15.1 G/DL (ref 12–16)
PCO2 BLDA: 33 MMHG (ref 35–45)
PH BLDA: 7.44 [PH] (ref 7.35–7.45)
PLATELET # BLD AUTO: 186 X10(3)/MCL (ref 130–400)
PMV BLD AUTO: 9.6 FL (ref 7.4–10.4)
PO2 BLDA: 64 MMHG (ref 80–100)
POTASSIUM BLOOD GAS (OHS): 3.9 MMOL/L (ref 3.5–5)
POTASSIUM SERPL-SCNC: 4.7 MMOL/L (ref 3.5–5.1)
PROT SERPL-MCNC: 6.9 GM/DL (ref 6.4–8.3)
RBC # BLD AUTO: 5.26 X10(6)/MCL (ref 4.2–5.4)
RSV RNA NPH QL NAA+NON-PROBE: NOT DETECTED
RV+EV RNA NPH QL NAA+NON-PROBE: NOT DETECTED
SAMPLE SITE BLOOD GAS (OHS): ABNORMAL
SAO2 % BLDA: 92.9 %
SARS-COV-2 RNA RESP QL NAA+PROBE: NOT DETECTED
SODIUM BLOOD GAS (OHS): 130 MMOL/L (ref 137–145)
SODIUM SERPL-SCNC: 134 MMOL/L (ref 136–145)
TROPONIN I SERPL-MCNC: <0.01 NG/ML (ref 0–0.04)
WBC # BLD AUTO: 10.63 X10(3)/MCL (ref 4.5–11.5)

## 2024-07-07 PROCEDURE — 11000001 HC ACUTE MED/SURG PRIVATE ROOM

## 2024-07-07 PROCEDURE — 25000242 PHARM REV CODE 250 ALT 637 W/ HCPCS: Performed by: INTERNAL MEDICINE

## 2024-07-07 PROCEDURE — 80053 COMPREHEN METABOLIC PANEL: CPT | Performed by: PHYSICIAN ASSISTANT

## 2024-07-07 PROCEDURE — 83880 ASSAY OF NATRIURETIC PEPTIDE: CPT | Performed by: PHYSICIAN ASSISTANT

## 2024-07-07 PROCEDURE — 99900031 HC PATIENT EDUCATION (STAT)

## 2024-07-07 PROCEDURE — 87798 DETECT AGENT NOS DNA AMP: CPT | Performed by: INTERNAL MEDICINE

## 2024-07-07 PROCEDURE — 99900035 HC TECH TIME PER 15 MIN (STAT)

## 2024-07-07 PROCEDURE — 99285 EMERGENCY DEPT VISIT HI MDM: CPT | Mod: 25

## 2024-07-07 PROCEDURE — 63600175 PHARM REV CODE 636 W HCPCS: Performed by: PHYSICIAN ASSISTANT

## 2024-07-07 PROCEDURE — 25000003 PHARM REV CODE 250: Performed by: INTERNAL MEDICINE

## 2024-07-07 PROCEDURE — 87581 M.PNEUMON DNA AMP PROBE: CPT | Performed by: INTERNAL MEDICINE

## 2024-07-07 PROCEDURE — 63600175 PHARM REV CODE 636 W HCPCS: Performed by: INTERNAL MEDICINE

## 2024-07-07 PROCEDURE — 82803 BLOOD GASES ANY COMBINATION: CPT

## 2024-07-07 PROCEDURE — 94760 N-INVAS EAR/PLS OXIMETRY 1: CPT | Mod: XB

## 2024-07-07 PROCEDURE — 25000003 PHARM REV CODE 250: Performed by: PHYSICIAN ASSISTANT

## 2024-07-07 PROCEDURE — 27000221 HC OXYGEN, UP TO 24 HOURS

## 2024-07-07 PROCEDURE — 93005 ELECTROCARDIOGRAM TRACING: CPT

## 2024-07-07 PROCEDURE — 83605 ASSAY OF LACTIC ACID: CPT | Performed by: PHYSICIAN ASSISTANT

## 2024-07-07 PROCEDURE — 93010 ELECTROCARDIOGRAM REPORT: CPT | Mod: ,,, | Performed by: INTERNAL MEDICINE

## 2024-07-07 PROCEDURE — 96367 TX/PROPH/DG ADDL SEQ IV INF: CPT

## 2024-07-07 PROCEDURE — 87070 CULTURE OTHR SPECIMN AEROBIC: CPT | Performed by: INTERNAL MEDICINE

## 2024-07-07 PROCEDURE — 36600 WITHDRAWAL OF ARTERIAL BLOOD: CPT

## 2024-07-07 PROCEDURE — 96365 THER/PROPH/DIAG IV INF INIT: CPT

## 2024-07-07 PROCEDURE — 96361 HYDRATE IV INFUSION ADD-ON: CPT

## 2024-07-07 PROCEDURE — 0240U COVID/FLU A&B PCR: CPT | Performed by: PHYSICIAN ASSISTANT

## 2024-07-07 PROCEDURE — 25000003 PHARM REV CODE 250

## 2024-07-07 PROCEDURE — 85025 COMPLETE CBC W/AUTO DIFF WBC: CPT | Performed by: PHYSICIAN ASSISTANT

## 2024-07-07 PROCEDURE — 84484 ASSAY OF TROPONIN QUANT: CPT | Performed by: PHYSICIAN ASSISTANT

## 2024-07-07 PROCEDURE — 63600175 PHARM REV CODE 636 W HCPCS

## 2024-07-07 PROCEDURE — 94640 AIRWAY INHALATION TREATMENT: CPT

## 2024-07-07 PROCEDURE — 87040 BLOOD CULTURE FOR BACTERIA: CPT | Performed by: PHYSICIAN ASSISTANT

## 2024-07-07 RX ORDER — ACETAMINOPHEN 325 MG/1
650 TABLET ORAL EVERY 4 HOURS PRN
Status: DISCONTINUED | OUTPATIENT
Start: 2024-07-07 | End: 2024-07-10 | Stop reason: HOSPADM

## 2024-07-07 RX ORDER — ACETAMINOPHEN 325 MG/1
650 TABLET ORAL
Status: COMPLETED | OUTPATIENT
Start: 2024-07-07 | End: 2024-07-07

## 2024-07-07 RX ORDER — IBUPROFEN 600 MG/1
600 TABLET ORAL
Status: DISCONTINUED | OUTPATIENT
Start: 2024-07-07 | End: 2024-07-07

## 2024-07-07 RX ORDER — ACETAMINOPHEN 325 MG/1
650 TABLET ORAL EVERY 8 HOURS PRN
Status: DISCONTINUED | OUTPATIENT
Start: 2024-07-07 | End: 2024-07-10 | Stop reason: HOSPADM

## 2024-07-07 RX ORDER — GUAIFENESIN 600 MG/1
600 TABLET, EXTENDED RELEASE ORAL 2 TIMES DAILY
Status: DISCONTINUED | OUTPATIENT
Start: 2024-07-07 | End: 2024-07-10 | Stop reason: HOSPADM

## 2024-07-07 RX ORDER — IPRATROPIUM BROMIDE AND ALBUTEROL SULFATE 2.5; .5 MG/3ML; MG/3ML
3 SOLUTION RESPIRATORY (INHALATION) EVERY 6 HOURS
Status: DISPENSED | OUTPATIENT
Start: 2024-07-07 | End: 2024-07-09

## 2024-07-07 RX ORDER — IBUPROFEN 200 MG
24 TABLET ORAL
Status: DISCONTINUED | OUTPATIENT
Start: 2024-07-07 | End: 2024-07-10 | Stop reason: HOSPADM

## 2024-07-07 RX ORDER — ENOXAPARIN SODIUM 100 MG/ML
40 INJECTION SUBCUTANEOUS EVERY 12 HOURS
Status: DISCONTINUED | OUTPATIENT
Start: 2024-07-07 | End: 2024-07-10 | Stop reason: HOSPADM

## 2024-07-07 RX ORDER — GUAIFENESIN 100 MG/5ML
200 SOLUTION ORAL EVERY 4 HOURS PRN
Status: DISCONTINUED | OUTPATIENT
Start: 2024-07-07 | End: 2024-07-10 | Stop reason: HOSPADM

## 2024-07-07 RX ORDER — ONDANSETRON HYDROCHLORIDE 2 MG/ML
4 INJECTION, SOLUTION INTRAVENOUS EVERY 4 HOURS PRN
Status: DISCONTINUED | OUTPATIENT
Start: 2024-07-07 | End: 2024-07-10 | Stop reason: HOSPADM

## 2024-07-07 RX ORDER — SODIUM CHLORIDE 9 MG/ML
INJECTION, SOLUTION INTRAVENOUS ONCE
Status: COMPLETED | OUTPATIENT
Start: 2024-07-07 | End: 2024-07-07

## 2024-07-07 RX ORDER — GLUCAGON 1 MG
1 KIT INJECTION
Status: DISCONTINUED | OUTPATIENT
Start: 2024-07-07 | End: 2024-07-10 | Stop reason: HOSPADM

## 2024-07-07 RX ORDER — SODIUM CHLORIDE 0.9 % (FLUSH) 0.9 %
10 SYRINGE (ML) INJECTION EVERY 12 HOURS PRN
Status: DISCONTINUED | OUTPATIENT
Start: 2024-07-07 | End: 2024-07-10 | Stop reason: HOSPADM

## 2024-07-07 RX ORDER — IBUPROFEN 200 MG
16 TABLET ORAL
Status: DISCONTINUED | OUTPATIENT
Start: 2024-07-07 | End: 2024-07-10 | Stop reason: HOSPADM

## 2024-07-07 RX ADMIN — IPRATROPIUM BROMIDE AND ALBUTEROL SULFATE 3 ML: 2.5; .5 SOLUTION RESPIRATORY (INHALATION) at 07:07

## 2024-07-07 RX ADMIN — ACETAMINOPHEN 650 MG: 325 TABLET, FILM COATED ORAL at 02:07

## 2024-07-07 RX ADMIN — METHYLPREDNISOLONE SODIUM SUCCINATE 40 MG: 40 INJECTION, POWDER, FOR SOLUTION INTRAMUSCULAR; INTRAVENOUS at 11:07

## 2024-07-07 RX ADMIN — ACETAMINOPHEN 650 MG: 325 TABLET, FILM COATED ORAL at 07:07

## 2024-07-07 RX ADMIN — CEFTRIAXONE SODIUM 1 G: 1 INJECTION, POWDER, FOR SOLUTION INTRAMUSCULAR; INTRAVENOUS at 01:07

## 2024-07-07 RX ADMIN — AZITHROMYCIN MONOHYDRATE 500 MG: 500 INJECTION, POWDER, LYOPHILIZED, FOR SOLUTION INTRAVENOUS at 02:07

## 2024-07-07 RX ADMIN — SODIUM CHLORIDE: 9 INJECTION, SOLUTION INTRAVENOUS at 12:07

## 2024-07-07 RX ADMIN — ENOXAPARIN SODIUM 40 MG: 40 INJECTION SUBCUTANEOUS at 08:07

## 2024-07-07 RX ADMIN — GUAIFENESIN 600 MG: 600 TABLET, EXTENDED RELEASE ORAL at 08:07

## 2024-07-07 RX ADMIN — METHYLPREDNISOLONE SODIUM SUCCINATE 40 MG: 40 INJECTION, POWDER, FOR SOLUTION INTRAMUSCULAR; INTRAVENOUS at 06:07

## 2024-07-07 NOTE — ED PROVIDER NOTES
Encounter Date: 7/7/2024       History     Chief Complaint   Patient presents with    Cough     Presents POV with c/o cough onset 7/4. Also reports chills, SOB, body aches, and fevers. Seen at  and Rx Tesslon pearls without relief. Had 2 negative at home COVID test. Took Tylenol at 0900.        HPI  43 year old female presents to the ED for worsening productive clear cough and SOB x 3 days. Associated symptoms include fever (Tmax 103), fatigue, generalized body aches, and decreased appetite. Pt was seen in urgent care for her symptoms yesterday and was given tessalon pearls for the cough with no improvement. Afebrile in ED, however pt took Tylenol just prior to arrival. She is a chronic tobacco user, 20 pack years. Reports a hx of bronchitis. Denies any additional medical history. Takes no medications daily.     Review of patient's allergies indicates:   Allergen Reactions    Latex, natural rubber Hives, Itching and Rash    Codeine     Tramadol      Other reaction(s): Not Indicated     History reviewed. No pertinent past medical history.  No past surgical history on file.  No family history on file.  Social History     Tobacco Use    Smoking status: Never    Smokeless tobacco: Never     Review of Systems   Constitutional:  Positive for appetite change (decreased), chills, fatigue and fever.   HENT:  Positive for congestion. Negative for drooling, ear pain, facial swelling, sore throat and trouble swallowing.    Eyes:  Negative for visual disturbance.   Respiratory:  Positive for cough and shortness of breath.    Cardiovascular:  Negative for chest pain, palpitations and leg swelling.   Gastrointestinal:  Negative for abdominal pain, diarrhea, nausea and vomiting.   Genitourinary:  Negative for difficulty urinating.   Musculoskeletal:  Positive for myalgias. Negative for neck pain and neck stiffness.   Skin:  Negative for rash.   Neurological:  Negative for light-headedness and headaches.       Physical Exam      Initial Vitals [07/07/24 1129]   BP Pulse Resp Temp SpO2   121/81 (!) 115 19 98.8 °F (37.1 °C) (!) 92 %      MAP       --         Physical Exam    Vitals reviewed.  Constitutional: No distress.   HENT:   Head: Normocephalic and atraumatic.   Right Ear: External ear normal.   Left Ear: External ear normal.   Nose: Nose normal.   Mouth/Throat: Oropharynx is clear and moist.   Eyes: Conjunctivae and EOM are normal.   Neck: Neck supple.   Normal range of motion.  Cardiovascular:  Regular rhythm and intact distal pulses.   Tachycardia present.         Pulmonary/Chest: She has no wheezes. She has no rhonchi. She has no rales.   Decreased breath sounds at the left lower lung. No wheezes, rales, or rhonchi    Abdominal: Abdomen is soft. She exhibits no distension. There is no abdominal tenderness.   Musculoskeletal:      Cervical back: Normal range of motion and neck supple.     Neurological: She is alert and oriented to person, place, and time.   Skin: Skin is warm.   Psychiatric: Thought content normal.         ED Course   Procedures  Labs Reviewed   COMPREHENSIVE METABOLIC PANEL - Abnormal; Notable for the following components:       Result Value    Sodium 134 (*)     CO2 19 (*)     Glucose 124 (*)     Creatinine 1.12 (*)     All other components within normal limits   BLOOD GAS - Abnormal; Notable for the following components:    pCO2, Blood gas 33.0 (*)     pO2, Blood gas 64.0 (*)     Sodium, Blood Gas 130 (*)     Calcium Level Ionized 1.07 (*)     O2 Hb, Blood Gas 91.8 (*)     CO Hgb 2.3 (*)     All other components within normal limits   B-TYPE NATRIURETIC PEPTIDE - Normal   TROPONIN I - Normal   COVID/FLU A&B PCR - Normal    Narrative:     The Xpert Xpress SARS-CoV-2/FLU/RSV plus is a rapid, multiplexed real-time PCR test intended for the simultaneous qualitative detection and differentiation of SARS-CoV-2, Influenza A, Influenza B, and respiratory syncytial virus (RSV) viral RNA in either nasopharyngeal swab or  nasal swab specimens.         LACTIC ACID, PLASMA - Normal   BLOOD CULTURE [facility]   BLOOD CULTURE [facility]   CBC W/ AUTO DIFFERENTIAL    Narrative:     The following orders were created for panel order CBC auto differential.  Procedure                               Abnormality         Status                     ---------                               -----------         ------                     CBC with Differential[[id number]]                           Final result                 Please view results for these tests on the individual orders.   CBC WITH DIFFERENTIAL        ECG Results              EKG 12-lead (Final result)        Collection Time Result Time QRS Duration OHS QTC Calculation    07/07/24 11:28:18 07/07/24 14:12:20 80 427                     Final result by Interface, Lab In [facility] (07/07/24 14:12:29)                   Narrative:    Test Reason : R06.02,    Vent. Rate : 113 BPM     Atrial Rate : 113 BPM     P-R Int : 126 ms          QRS Dur : 080 ms      QT Int : 312 ms       P-R-T Axes : 056 017 020 degrees     QTc Int : 427 ms    Sinus tachycardia  Otherwise normal ECG  No previous ECGs available  Confirmed by Alexsander Alberts MD ([id number]) on 7/7/2024 2:12:19 PM    Referred By:             Confirmed By:Alexsander Alberts MD                                  Imaging Results               X-Ray Chest 1 View (Final result)  Result time 07/07/24 11:59:07      Final result by Parrish Garcia MD (07/07/24 11:59:07)                   Narrative:    EXAMINATION  XR CHEST 1 VIEW    CLINICAL HISTORY  Shortness of breath    TECHNIQUE  A total of 1 frontal image(s) of the chest.    COMPARISON  20 November 2021    FINDINGS  Lines/tubes/devices: ECG leads overlie the imaged region.    The cardiomediastinal silhouette and central pulmonary vasculature are unremarkable for utilized technique.  The trachea is midline.  Ill-defined left perihilar infiltrate is noted, with remainder of the bilateral lung fields clear.  No large  pleural effusion or convincing pneumothorax.    There is no acute osseous or extrathoracic abnormality.    IMPRESSION  Findings suggestive of developing left perihilar pneumonia.    ==========    Follow-up 2-view chest radiographs recommended in 6-8 weeks in order to ensure resolution of suspected infectious/inflammatory lung findings.    This report was flagged in Epic as abnormal.      Electronically signed by: Parrish Garcia  Date:    07/07/2024  Time:    11:59                                     Medications   azithromycin 500 mg in dextrose 5 % 250 mL IVPB (ready to mix) (500 mg Intravenous New Bag 7/7/24 1427)   cefTRIAXone (Rocephin) 1 g in D5W 100 mL IVPB (MB+) (0 g Intravenous Stopped 7/7/24 1427)   0.9%  NaCl infusion (0 mL/hr Intravenous Stopped 7/7/24 1445)   acetaminophen tablet 650 mg (650 mg Oral Given 7/7/24 1453)     Medical Decision Making  Risk  OTC drugs.  Prescription drug management.  Decision regarding hospitalization.      Additional MDM:   Differential Diagnosis:   Symptom: Cough. <> The follow diagnoses were considered and will be evaluated: Pneumonia, Pulmonary Edema, Pulmonary Embolism, Smoker's Cough and Viral Bronchitis.        APC / Resident Notes:   43 year old female presents to the ED for worsening cough and SOB x 3 days. Associated symptoms include fever (Tmax 103), fatigue, generalized body aches, and decreased appetite. Afebrile in ED, however pt took Tylenol just prior to arrival. She is a chronic tobacco user, 20 pack years. No other medical history.     Afebrile, tachycardic, O2 sats 92% on room air. Pt placed on supplemental oxygen via NC. ABGs obtained, pO2 of 64 while on supplemental oxygen. Labs show normal WBC. Lactic acid wnl. CMP reveals mild GERBER. CXR reveals a left perihilar infiltrate. Pt was treated with bolus of NS, 1g Rocephin IV, and zithromax IV in the ED. Given pneumonia with hypoxia requiring oxygen, will consult hospital medicine for admission.                                 Clinical Impression:  Final diagnoses:  [R06.02] Shortness of breath  [J18.9] Pneumonia of left lung due to infectious organism, unspecified part of lung (Primary)          ED Disposition Condition    Admit Stable                Johana Rhoades DO  Resident  07/07/24 9954

## 2024-07-07 NOTE — H&P
[facility] Hospital Medicine History & Physical Examination       Patient Name: Yoli Saldaña  MRN: [id number]  Patient Class: IP- Inpatient   Admission Date: 7/7/2024   Admitting Physician: Jeffrey Avila MD   Length of Stay: 0  Attending Physician: Jeffrey Avila MD   Primary Care Provider: Tj Lares NP  Face-to-Face encounter date: 07/07/2024  Code Status: Full Code   Chief Complaint: Cough (Presents POV with c/o cough onset 7/4. Also reports chills, SOB, body aches, and fevers. Seen at  and Rx Tesslon pearls without relief. Had 2 negative at home COVID test. Took Tylenol at 0900. /)        Patient information was obtained from patient, patient's family, past medical records and ER records.     HISTORY OF PRESENT ILLNESS:   Yoli Saldaña is a 43 y.o. White female with a past medical history of morbid obesity. The patient presented to [facility] on 7/7/2024 with a primary complaint of shortness a breath which has been ongoing for the last few days, clear productive cough and fever of 2 days.  Fever on 07/05/2024 was 103.7 ° F. she denies complaints of chest pain, nausea, vomiting, diarrhea and abdominal pain.  She does admit to smoking 1 pack of cigarettes per day.    Upon presentation to the ED, temperature 98.8° F, heart rate 115, blood pressure 121/81, respiratory rate 19 and SpO2 92% on 2 L nasal cannula.  Labs with WBC 10.63, CO2 19, BUN/creatinine 13.3/1.12 (9.6/0.76 in 2023), BNP less than 10, troponin less than 1.01.  Influenza A/B and SARS-C chest x-ray with findings suggestive of developing left perihilar pneumonia.  OV-2 PCR negative.  ABG with pH 7.44, pCO2 33, PO2 64.  EKG sinus tachycardia with ventricular rate of 113.  Chest x-ray with findings suggestive of developing left perihilar pneumonia.  In ED patient received IV fluid hydration, Tylenol, Rocephin, azithromycin.  She was admitted to hospital medicine services for further medical  management.        PAST MEDICAL HISTORY:   Morbid obesity    PAST SURGICAL HISTORY:   Hysterectomy    ALLERGIES:   Latex, natural rubber; Codeine; and Tramadol    FAMILY HISTORY:   Mother:  Descending aortic aneurysm    SOCIAL HISTORY:   Smokes 1 pack of cigarettes per day   Former alcohol user   Denies illicit drug use     Screening for Social Drivers for health:  Patient screened for food insecurity, housing instability, transportation needs, utility difficulties, and interpersonal safety (select all that apply as identified as concern)  []Housing or Food  []Transportation Needs  []Utility Difficulties  []Interpersonal safety  [x]None    HOME MEDICATIONS:     Prior to Admission medications    Medication Sig Start Date End Date Taking? Authorizing Provider   amoxicillin-clavulanate 875-125mg (AUGMENTIN) 875-125 mg per tablet Take 1 tablet by mouth 2 (two) times daily. 4/2/23   Saul Smith MD   benzonatate (TESSALON PERLES) 100 MG capsule Take 1 capsule (100 mg total) by mouth 3 (three) times daily as needed for Cough. 7/6/24 7/16/24  Sharri Lnodono MD   HYDROcodone-acetaminophen (NORCO) 5-325 mg per tablet Take 1 tablet by mouth every 8 (eight) hours as needed for Pain. 4/2/23   Saul Smith MD   ondansetron (ZOFRAN-ODT) 4 MG TbDL Take 1 tablet (4 mg total) by mouth every 8 (eight) hours as needed (nausea vomiting). 4/2/23   Saul Smith MD       REVIEW OF SYSTEMS:   Except as documented, all other systems reviewed and negative     PHYSICAL EXAM:     VITAL SIGNS: 24 HRS MIN & MAX LAST   Temp  Min: 98.8 °F (37.1 °C)  Max: 102.5 °F (39.2 °C) 99.8 °F (37.7 °C)   BP  Min: 113/91  Max: 123/81 (!) 113/91   Pulse  Min: 100  Max: 115  101   Resp  Min: 16  Max: 19 16   SpO2  Min: 92 %  Max: 96 % 95 %       General appearance: Well-developed, well-nourished female in no apparent distress.  Son at bedside.  HEENT: Atraumatic head. Moist mucous membranes of oral cavity.  Lungs: Clear to auscultation  bilaterally.  On 2 L O2 via nasal cannula.  Heart: Regular rate and rhythm.   Abdomen:  Morbidly obese, non-distended.  Extremities: No cyanosis, clubbing. No deformities.  Skin: No Rash. Warm and dry.  Neuro: Awake, alert and oriented. Motor and sensory exams grossly intact.  Psych/mental status: Appropriate mood and affect. Cooperative. Responds appropriately to questions.       LABS AND IMAGING:     Recent Labs   Lab 07/07/24 1158   WBC 10.63   RBC 5.26   HGB 15.3   HCT 45.5   MCV 86.5   MCH 29.1   MCHC 33.6   RDW 13.2      MPV 9.6       Recent Labs   Lab 07/07/24  1158 07/07/24  1427   *  --    K 4.7  --      --    CO2 19*  --    BUN 13.3  --    CREATININE 1.12*  --    CALCIUM 9.2  --    PH  --  7.440   ALBUMIN 3.8  --    ALKPHOS 54  --    ALT 18  --    AST 19  --    BILITOT 0.5  --        Microbiology Results (last 7 days)       Procedure Component Value Units Date/Time    Respiratory Culture [[id number]]     Order Status: Sent Specimen: Sputum, Expectorated     Blood culture #2 **CANNOT BE ORDERED STAT** [[id number]] Collected: 07/07/24 1158    Order Status: Resulted Specimen: Blood Updated: 07/07/24 1326    Blood culture #1 **CANNOT BE ORDERED STAT** [[id number]] Collected: 07/07/24 1158    Order Status: Resulted Specimen: Blood Updated: 07/07/24 1326             X-Ray Chest 1 View  EXAMINATION  XR CHEST 1 VIEW    CLINICAL HISTORY  Shortness of breath    TECHNIQUE  A total of 1 frontal image(s) of the chest.    COMPARISON  20 November 2021    FINDINGS  Lines/tubes/devices: ECG leads overlie the imaged region.    The cardiomediastinal silhouette and central pulmonary vasculature are unremarkable for utilized technique.  The trachea is midline.  Ill-defined left perihilar infiltrate is noted, with remainder of the bilateral lung fields clear.  No large pleural effusion or convincing pneumothorax.    There is no acute osseous or extrathoracic abnormality.    IMPRESSION  Findings suggestive of  developing left perihilar pneumonia.    ==========    Follow-up 2-view chest radiographs recommended in 6-8 weeks in order to ensure resolution of suspected infectious/inflammatory lung findings.    This report was flagged in Epic as abnormal.    Electronically signed by: Parrish Garcia  Date:    07/07/2024  Time:    11:59        ASSESSMENT & PLAN:   Assessment:  Acute hypoxemic respiratory failure secondary to left perihilar community-acquired pneumonia   Acute kidney injury   Metabolic acidosis   Tobacco use  Morbid obesity     Plan:  - Continue Rocephin and azithromycin  - Follow results of blood and respiratory culture   - IV fluid hydration  - Resume appropriate home medications when deemed necessary   - Labs in AM      VTE Prophylaxis: will be placed on Lovenox for DVT prophylaxis and will be advised to be as mobile as possible and sit in a chair as tolerated      __________________________________________________________________________  INPATIENT LIST OF MEDICATIONS     Current Facility-Administered Medications:     acetaminophen tablet 650 mg, 650 mg, Oral, Q8H PRN, Callie Ware PA-VAHE    acetaminophen tablet 650 mg, 650 mg, Oral, Q4H PRN, Callie Ware PA-VAHE    [START ON 7/8/2024] azithromycin 500 mg in dextrose 5 % 250 mL IVPB (ready to mix), 500 mg, Intravenous, Q24H, Callie Ware PA-VAHE    [START ON 7/8/2024] cefTRIAXone (Rocephin) 1 g in D5W 100 mL IVPB (MB+), 1 g, Intravenous, Q24H, Callie Ware PA-VAHE    dextrose 10% bolus 125 mL 125 mL, 12.5 g, Intravenous, PRN, Callie Ware PA-VAHE    dextrose 10% bolus 250 mL 250 mL, 25 g, Intravenous, PRN, Callie Ware PA-VAHE    enoxaparin injection 40 mg, 40 mg, Subcutaneous, Q12H (prophylaxis, 0900/2100), Ware, Callie E., PA-C    glucagon (human recombinant) injection 1 mg, 1 mg, Intramuscular, PRN, Callie Ware, PA-C    glucose chewable tablet 16 g, 16 g, Oral, PRN, Callie Ware, PA-C    glucose chewable tablet 24 g, 24 g,  Oral, PRN, Callie Ware PA-C    ondansetron injection 4 mg, 4 mg, Intravenous, Q4H PRN, Callie Ware PA-C    sodium chloride 0.9% flush 10 mL, 10 mL, Intravenous, Q12H PRN, Callie Ware PA-C    Current Outpatient Medications:     amoxicillin-clavulanate 875-125mg (AUGMENTIN) 875-125 mg per tablet, Take 1 tablet by mouth 2 (two) times daily., Disp: 14 tablet, Rfl: 0    benzonatate (TESSALON PERLES) 100 MG capsule, Take 1 capsule (100 mg total) by mouth 3 (three) times daily as needed for Cough., Disp: 20 capsule, Rfl: 0    HYDROcodone-acetaminophen (NORCO) 5-325 mg per tablet, Take 1 tablet by mouth every 8 (eight) hours as needed for Pain., Disp: 13 tablet, Rfl: 0    ondansetron (ZOFRAN-ODT) 4 MG TbDL, Take 1 tablet (4 mg total) by mouth every 8 (eight) hours as needed (nausea vomiting)., Disp: 12 tablet, Rfl: 0      Scheduled Meds:   [START ON 7/8/2024] azithromycin  500 mg Intravenous Q24H    [START ON 7/8/2024] cefTRIAXone (Rocephin) IV (PEDS and ADULTS)  1 g Intravenous Q24H    enoxparin  40 mg Subcutaneous Q12H (prophylaxis, 0900/2100)     Continuous Infusions:  PRN Meds:.  Current Facility-Administered Medications:     acetaminophen, 650 mg, Oral, Q8H PRN    acetaminophen, 650 mg, Oral, Q4H PRN    dextrose 10%, 12.5 g, Intravenous, PRN    dextrose 10%, 25 g, Intravenous, PRN    glucagon (human recombinant), 1 mg, Intramuscular, PRN    glucose, 16 g, Oral, PRN    glucose, 24 g, Oral, PRN    ondansetron, 4 mg, Intravenous, Q4H PRN    sodium chloride 0.9%, 10 mL, Intravenous, Q12H PRN      Discharge Planning and Disposition: Anticipated discharge to be determined.    Callie HERNANDEZ PA, have reviewed and discussed the case with Dr. Jeffrey Avila MD    Please see the following addendum for further assessment and plan from there attending MD.      Portion of this note is dictated using EMR integrated voice recognition software and may be subjected to voice recognition errors not corrected  with proofreading. Please  for clarification if needed.       Callie Ware PA-C  07/07/2024      I Dr. Claudia Avila performed substantive portion of the visit, personally performed a face to face evaluation of the patient and have reviewed and agree with NP/PA documentation, treatment and plan & MDM.     43-year-old morbidly obese woman with the above-mentioned medical history presented with progressively worsening shortness of breath associated with cough, exertional dyspnea, subjective fever for the past few days.  At presentation she was tachycardic, and was hypoxic requiring oxygen cannula supplementation.  At baseline she is independent with ADLs and IADLs, smokes a pack of cigarettes per day and never had diagnosis of COPD and does not take any medications.  At presentation white count was mildly elevated and she was in NSTEMI type 2.  Chest x-ray was concerning for possible community-acquired pneumonia.  When seen at bedside she was alert, appeared in mild distress.  Son was at bedside.  Lung exam showed bronchial breathing, mild expiratory wheeze on both sides.  Agree with the HPI, examined, plan mentioned above.  We will continue scheduled neb treatments, steroid therapy empiric community-acquired pneumonia treatment.  We will also get respiratory PCR and respiratory cultures..  She will ask for nicotine patch if necessary.  Also get TTE rule out CHF.  Needs outpatient lung pancreatitis.  Counseled on smoking cessation, PCP follow up on medication adherence.    Critical care diagnosis:  Acute hypoxemic respiratory failure, possible community-acquired pneumonia  Critical care time: 50 minutes

## 2024-07-07 NOTE — Clinical Note
Diagnosis: Pneumonia of left lung due to infectious organism, unspecified part of lung [[id number]]   Future Attending Provider: DOT AVILA [[id number]]   Admit to which facility:: [facility] [[id number]]   Reason for IP Medical Treatment  (Clinical interventions that can only be accomplished in the IP setting? ) :: hypoxia   I certify that Inpatient services for greater than or equal to 2 midnights are medically necessary:: Yes   Plans for Post-Acute care--if anticipated (pick the single best option):: A. No post acute care anticipated at this time

## 2024-07-07 NOTE — FIRST PROVIDER EVALUATION
"Medical screening examination initiated.  I have conducted a focused provider triage encounter, findings are as follows:  Chief Complaint   Patient presents with    Cough     Presents POV with c/o cough onset 7/4. Also reports chills, SOB, body aches, and fevers. Seen at  and Rx Tesslon pearls without relief. Had 2 negative at home COVID test. Took Tylenol at 0900.          Brief history of present illness:  43-year-old female presents to ED for evaluation of cough, congestion worsening since 07/04/2024.  Complains of body aches and fever.  Complains of shortness of breath Reports had negative COVID test at home.  Seen at urgent care and given Tessalon Perles at discharge.  States that she was having worsening symptoms.    Vitals:    07/07/24 1129   BP: 121/81   BP Location: Left arm   Patient Position: Sitting   Pulse: (!) 115   Resp: 19   Temp: 98.8 °F (37.1 °C)   SpO2: (!) 92%   Weight: (!) 154.2 kg (340 lb)   Height: 5' 7" (1.702 m)       Pertinent physical exam:  Patient awake and alert sitting in wheelchair.     Brief workup plan:  labs, EKG, CXR, lactic, blood cultures, COVID/FLU    Preliminary workup initiated; this workup will be continued and followed by the physician or advanced practice provider that is assigned to the patient when roomed.  "

## 2024-07-08 LAB
ALBUMIN SERPL-MCNC: 3.5 G/DL (ref 3.5–5)
ALBUMIN/GLOB SERPL: 1.2 RATIO (ref 1.1–2)
ALP SERPL-CCNC: 51 UNIT/L (ref 40–150)
ALT SERPL-CCNC: 26 UNIT/L (ref 0–55)
ANION GAP SERPL CALC-SCNC: 10 MEQ/L
APICAL FOUR CHAMBER EJECTION FRACTION: 53 %
APICAL TWO CHAMBER EJECTION FRACTION: 66 %
AST SERPL-CCNC: 23 UNIT/L (ref 5–34)
AV INDEX (PROSTH): 0.74
AV MEAN GRADIENT: 4 MMHG
AV PEAK GRADIENT: 7 MMHG
AV VALVE AREA BY VELOCITY RATIO: 2.26 CM²
AV VALVE AREA: 2.34 CM²
AV VELOCITY RATIO: 0.72
BASOPHILS # BLD AUTO: 0.01 X10(3)/MCL
BASOPHILS NFR BLD AUTO: 0.1 %
BILIRUB SERPL-MCNC: 0.4 MG/DL
BSA FOR ECHO PROCEDURE: 2.7 M2
BUN SERPL-MCNC: 14.7 MG/DL (ref 7–18.7)
CALCIUM SERPL-MCNC: 8.7 MG/DL (ref 8.4–10.2)
CHLORIDE SERPL-SCNC: 106 MMOL/L (ref 98–107)
CO2 SERPL-SCNC: 19 MMOL/L (ref 22–29)
CREAT SERPL-MCNC: 0.85 MG/DL (ref 0.55–1.02)
CREAT/UREA NIT SERPL: 17
CV ECHO LV RWT: 0.53 CM
DOP CALC AO PEAK VEL: 1.35 M/S
DOP CALC AO VTI: 26.6 CM
DOP CALC LVOT AREA: 3.1 CM2
DOP CALC LVOT DIAMETER: 2 CM
DOP CALC LVOT PEAK VEL: 0.97 M/S
DOP CALC LVOT STROKE VOLUME: 62.17 CM3
DOP CALC MV VTI: 25.2 CM
DOP CALCLVOT PEAK VEL VTI: 19.8 CM
E WAVE DECELERATION TIME: 209 MSEC
E/A RATIO: 0.99
E/E' RATIO: 5.68 M/S
ECHO LV POSTERIOR WALL: 1 CM (ref 0.6–1.1)
EOSINOPHIL # BLD AUTO: 0 X10(3)/MCL (ref 0–0.9)
EOSINOPHIL NFR BLD AUTO: 0 %
ERYTHROCYTE [DISTWIDTH] IN BLOOD BY AUTOMATED COUNT: 13.1 % (ref 11.5–17)
FRACTIONAL SHORTENING: 42 % (ref 28–44)
GFR SERPLBLD CREATININE-BSD FMLA CKD-EPI: >60 ML/MIN/1.73/M2
GLOBULIN SER-MCNC: 2.9 GM/DL (ref 2.4–3.5)
GLUCOSE SERPL-MCNC: 153 MG/DL (ref 74–100)
HCT VFR BLD AUTO: 43.5 % (ref 37–47)
HGB BLD-MCNC: 14.4 G/DL (ref 12–16)
IMM GRANULOCYTES # BLD AUTO: 0.02 X10(3)/MCL (ref 0–0.04)
IMM GRANULOCYTES NFR BLD AUTO: 0.3 %
INTERVENTRICULAR SEPTUM: 1 CM (ref 0.6–1.1)
LEFT ATRIUM AREA SYSTOLIC (APICAL 2 CHAMBER): 12.4 CM2
LEFT ATRIUM AREA SYSTOLIC (APICAL 4 CHAMBER): 15.6 CM2
LEFT ATRIUM SIZE: 3.1 CM
LEFT ATRIUM VOLUME INDEX MOD: 14.2 ML/M2
LEFT ATRIUM VOLUME MOD: 36 CM3
LEFT INTERNAL DIMENSION IN SYSTOLE: 2.2 CM (ref 2.1–4)
LEFT VENTRICLE DIASTOLIC VOLUME INDEX: 24.49 ML/M2
LEFT VENTRICLE DIASTOLIC VOLUME: 61.95 ML
LEFT VENTRICLE END DIASTOLIC VOLUME APICAL 2 CHAMBER: 47.4 ML
LEFT VENTRICLE END DIASTOLIC VOLUME APICAL 4 CHAMBER: 89 ML
LEFT VENTRICLE END SYSTOLIC VOLUME APICAL 2 CHAMBER: 28.7 ML
LEFT VENTRICLE END SYSTOLIC VOLUME APICAL 4 CHAMBER: 39.2 ML
LEFT VENTRICLE MASS INDEX: 46 G/M2
LEFT VENTRICLE SYSTOLIC VOLUME INDEX: 6.4 ML/M2
LEFT VENTRICLE SYSTOLIC VOLUME: 16.2 ML
LEFT VENTRICULAR INTERNAL DIMENSION IN DIASTOLE: 3.8 CM (ref 3.5–6)
LEFT VENTRICULAR MASS: 117.28 G
LV LATERAL E/E' RATIO: 4.73 M/S
LV SEPTAL E/E' RATIO: 7.1 M/S
LVED V (TEICH): 61.95 ML
LVES V (TEICH): 16.2 ML
LVOT MG: 2 MMHG
LVOT MV: 0.66 CM/S
LYMPHOCYTES # BLD AUTO: 1.11 X10(3)/MCL (ref 0.6–4.6)
LYMPHOCYTES NFR BLD AUTO: 16.2 %
MCH RBC QN AUTO: 29.1 PG (ref 27–31)
MCHC RBC AUTO-ENTMCNC: 33.1 G/DL (ref 33–36)
MCV RBC AUTO: 87.9 FL (ref 80–94)
MONOCYTES # BLD AUTO: 0.28 X10(3)/MCL (ref 0.1–1.3)
MONOCYTES NFR BLD AUTO: 4.1 %
MV MEAN GRADIENT: 2 MMHG
MV PEAK A VEL: 0.72 M/S
MV PEAK E VEL: 0.71 M/S
MV PEAK GRADIENT: 3 MMHG
MV STENOSIS PRESSURE HALF TIME: 67 MS
MV VALVE AREA BY CONTINUITY EQUATION: 2.47 CM2
MV VALVE AREA P 1/2 METHOD: 3.28 CM2
NEUTROPHILS # BLD AUTO: 5.43 X10(3)/MCL (ref 2.1–9.2)
NEUTROPHILS NFR BLD AUTO: 79.3 %
NRBC BLD AUTO-RTO: 0 %
OHS LV EJECTION FRACTION SIMPSONS BIPLANE MOD: 61 %
PISA TR MAX VEL: 1.48 M/S
PLATELET # BLD AUTO: 194 X10(3)/MCL (ref 130–400)
PMV BLD AUTO: 10.3 FL (ref 7.4–10.4)
POTASSIUM SERPL-SCNC: 4.7 MMOL/L (ref 3.5–5.1)
PROT SERPL-MCNC: 6.4 GM/DL (ref 6.4–8.3)
PV PEAK GRADIENT: 4 MMHG
PV PEAK VELOCITY: 0.98 M/S
RA PRESSURE ESTIMATED: 3 MMHG
RBC # BLD AUTO: 4.95 X10(6)/MCL (ref 4.2–5.4)
RV TB RVSP: 4 MMHG
SODIUM SERPL-SCNC: 135 MMOL/L (ref 136–145)
TDI LATERAL: 0.15 M/S
TDI SEPTAL: 0.1 M/S
TDI: 0.13 M/S
TR MAX PG: 9 MMHG
TRICUSPID ANNULAR PLANE SYSTOLIC EXCURSION: 2.09 CM
TV REST PULMONARY ARTERY PRESSURE: 12 MMHG
WBC # BLD AUTO: 6.85 X10(3)/MCL (ref 4.5–11.5)
Z-SCORE OF LEFT VENTRICULAR DIMENSION IN END DIASTOLE: -13.79
Z-SCORE OF LEFT VENTRICULAR DIMENSION IN END SYSTOLE: -11.07

## 2024-07-08 PROCEDURE — 94760 N-INVAS EAR/PLS OXIMETRY 1: CPT

## 2024-07-08 PROCEDURE — 63600175 PHARM REV CODE 636 W HCPCS: Performed by: PHYSICIAN ASSISTANT

## 2024-07-08 PROCEDURE — 99900035 HC TECH TIME PER 15 MIN (STAT)

## 2024-07-08 PROCEDURE — 27000221 HC OXYGEN, UP TO 24 HOURS

## 2024-07-08 PROCEDURE — 25000003 PHARM REV CODE 250: Performed by: PHYSICIAN ASSISTANT

## 2024-07-08 PROCEDURE — 63600175 PHARM REV CODE 636 W HCPCS: Performed by: INTERNAL MEDICINE

## 2024-07-08 PROCEDURE — 80053 COMPREHEN METABOLIC PANEL: CPT | Performed by: PHYSICIAN ASSISTANT

## 2024-07-08 PROCEDURE — 85025 COMPLETE CBC W/AUTO DIFF WBC: CPT | Performed by: PHYSICIAN ASSISTANT

## 2024-07-08 PROCEDURE — 99900031 HC PATIENT EDUCATION (STAT)

## 2024-07-08 PROCEDURE — 94640 AIRWAY INHALATION TREATMENT: CPT

## 2024-07-08 PROCEDURE — 11000001 HC ACUTE MED/SURG PRIVATE ROOM

## 2024-07-08 PROCEDURE — 25000242 PHARM REV CODE 250 ALT 637 W/ HCPCS: Performed by: INTERNAL MEDICINE

## 2024-07-08 PROCEDURE — 36415 COLL VENOUS BLD VENIPUNCTURE: CPT | Performed by: PHYSICIAN ASSISTANT

## 2024-07-08 PROCEDURE — 25000003 PHARM REV CODE 250: Performed by: INTERNAL MEDICINE

## 2024-07-08 RX ADMIN — METHYLPREDNISOLONE SODIUM SUCCINATE 40 MG: 40 INJECTION, POWDER, FOR SOLUTION INTRAMUSCULAR; INTRAVENOUS at 05:07

## 2024-07-08 RX ADMIN — GUAIFENESIN 600 MG: 600 TABLET, EXTENDED RELEASE ORAL at 09:07

## 2024-07-08 RX ADMIN — CEFTRIAXONE SODIUM 1 G: 1 INJECTION, POWDER, FOR SOLUTION INTRAMUSCULAR; INTRAVENOUS at 01:07

## 2024-07-08 RX ADMIN — IPRATROPIUM BROMIDE AND ALBUTEROL SULFATE 3 ML: 2.5; .5 SOLUTION RESPIRATORY (INHALATION) at 03:07

## 2024-07-08 RX ADMIN — GUAIFENESIN 600 MG: 600 TABLET, EXTENDED RELEASE ORAL at 08:07

## 2024-07-08 RX ADMIN — ENOXAPARIN SODIUM 40 MG: 40 INJECTION SUBCUTANEOUS at 08:07

## 2024-07-08 RX ADMIN — AZITHROMYCIN MONOHYDRATE 500 MG: 500 INJECTION, POWDER, LYOPHILIZED, FOR SOLUTION INTRAVENOUS at 12:07

## 2024-07-08 RX ADMIN — ENOXAPARIN SODIUM 40 MG: 40 INJECTION SUBCUTANEOUS at 09:07

## 2024-07-08 RX ADMIN — IPRATROPIUM BROMIDE AND ALBUTEROL SULFATE 3 ML: 2.5; .5 SOLUTION RESPIRATORY (INHALATION) at 07:07

## 2024-07-08 RX ADMIN — GUAIFENESIN 200 MG: 200 SOLUTION ORAL at 05:07

## 2024-07-08 RX ADMIN — IPRATROPIUM BROMIDE AND ALBUTEROL SULFATE 3 ML: 2.5; .5 SOLUTION RESPIRATORY (INHALATION) at 10:07

## 2024-07-08 NOTE — NURSING
Nurses Note -- 4 Eyes      7/7/2024   8:28 PM      Skin assessed during: Admit      [x] No Altered Skin Integrity Present    [x]Prevention Measures Documented      [] Yes- Altered Skin Integrity Present or Discovered   [] LDA Added if Not in Epic (Describe Wound)   [] New Altered Skin Integrity was Present on Admit and Documented in LDA   [] Wound Image Taken    Wound Care Consulted? No    Attending Nurse:  Michell Lerma RN/Staff Member:  [place]

## 2024-07-08 NOTE — PLAN OF CARE
Problem: Adult Inpatient Plan of Care  Goal: Plan of Care Review  7/8/2024 1710 by Fransisco Jordan RN  Outcome: Progressing  7/8/2024 0815 by Fransisco Jordan RN  Outcome: Progressing  Goal: Patient-Specific Goal (Individualized)  7/8/2024 1710 by Fransisco Jordan RN  Outcome: Progressing  7/8/2024 0815 by Fransisco Jordan RN  Outcome: Progressing  Goal: Absence of Hospital-Acquired Illness or Injury  7/8/2024 1710 by Fransisco Jrodan RN  Outcome: Progressing  7/8/2024 0815 by Fransisco Jordan RN  Outcome: Progressing  Goal: Optimal Comfort and Wellbeing  7/8/2024 1710 by Fransisco Jordan RN  Outcome: Progressing  7/8/2024 0815 by Fransisco Jordan RN  Outcome: Progressing  Goal: Readiness for Transition of Care  7/8/2024 1710 by Fransisco Jordan RN  Outcome: Progressing  7/8/2024 0815 by Fransisco Jordan RN  Outcome: Progressing     Problem: Bariatric Environmental Safety  Goal: Safety Maintained with Care  7/8/2024 1710 by Fransisco Jordan RN  Outcome: Progressing  7/8/2024 0815 by Fransisco Jordan RN  Reactivated

## 2024-07-08 NOTE — PLAN OF CARE
Problem: Adult Inpatient Plan of Care  Goal: Plan of Care Review  Outcome: Progressing  Goal: Patient-Specific Goal (Individualized)  Outcome: Progressing  Goal: Absence of Hospital-Acquired Illness or Injury  Outcome: Progressing  Goal: Optimal Comfort and Wellbeing  Outcome: Progressing  Goal: Readiness for Transition of Care  Outcome: Progressing     Problem: Bariatric Environmental Safety  Goal: Safety Maintained with Care  Reactivated

## 2024-07-08 NOTE — PROGRESS NOTES
[facility] Hospital Medicine Progress Note        Chief Complaint: Inpatient Follow-up for acute hypoxemic respiratory failure due to community-acquired    HPI per admitting team: Yoli Saldaña is a 43 y.o. White female with a past medical history of morbid obesity. The patient presented to [facility] on 7/7/2024 with a primary complaint of shortness a breath which has been ongoing for the last few days, clear productive cough and fever of 2 days.  Fever on 07/05/2024 was 103.7 ° F. she denies complaints of chest pain, nausea, vomiting, diarrhea and abdominal pain.  She does admit to smoking 1 pack of cigarettes per day.  Upon presentation to the ED, temperature 98.8° F, heart rate 115, blood pressure 121/81, respiratory rate 19 and SpO2 92% on 2 L nasal cannula.  Labs with WBC 10.63, CO2 19, BUN/creatinine 13.3/1.12 (9.6/0.76 in 2023), BNP less than 10, troponin less than 1.01.  Influenza A/B and SARS-C chest x-ray with findings suggestive of developing left perihilar pneumonia.  OV-2 PCR negative.  ABG with pH 7.44, pCO2 33, PO2 64.  EKG sinus tachycardia with ventricular rate of 113.  Chest x-ray with findings suggestive of developing left perihilar pneumonia.  In ED patient received IV fluid hydration, Tylenol, Rocephin, azithromycin.  She was admitted to hospital medicine services for further medical management.    Interval Hx:   Patient is sitting in bed, reports started to feel better, still on supplemental oxygen via nasal cannula 2 liters/minute  Reported 103° temperature at home prior to arrival  Patient reports she still smokes 1 per day, finds quitting smoking very difficult, offered nicotine patch but patient reports she is okay as she has not smoked in the past 2 days when she started feeling bad  No family is at bedside  Case was discussed with patient's nurse and  on the floor.    Objective/physical exam:  General: In no acute distress, afebrile, morbidly  obese  Chest: Clear to auscultation bilaterally anteriorly, supplemental oxygen via nasal cannula at 2 liters/minute  Heart: RRR, +S1, S2, no appreciable murmur  Abdomen: Soft, nontender, BS +  MSK: Warm, no lower extremity edema, no clubbing or cyanosis  Neurologic: Alert and oriented x4, Cranial nerve II-XII intact, Strength 5/5 in all 4 extremities  Psych:  Slightly depressed, sad    VITAL SIGNS: 24 HRS MIN & MAX LAST   Temp  Min: 98.1 °F (36.7 °C)  Max: 99.5 °F (37.5 °C) 98.2 °F (36.8 °C)   BP  Min: 101/63  Max: 146/81 (!) 143/88   Pulse  Min: 73  Max: 92  92   Resp  Min: 18  Max: 22 20   SpO2  Min: 91 %  Max: 96 % (!) 94 %     I reviewed the labs below:  Recent Labs   Lab 07/07/24  1158 07/08/24  0559   WBC 10.63 6.85   RBC 5.26 4.95   HGB 15.3 14.4   HCT 45.5 43.5   MCV 86.5 87.9   MCH 29.1 29.1   MCHC 33.6 33.1   RDW 13.2 13.1    194   MPV 9.6 10.3     Recent Labs   Lab 07/07/24  1158 07/07/24  1427 07/08/24  0559   *  --  135*   K 4.7  --  4.7     --  106   CO2 19*  --  19*   BUN 13.3  --  14.7   CREATININE 1.12*  --  0.85   CALCIUM 9.2  --  8.7   PH  --  7.440  --    ALBUMIN 3.8  --  3.5   ALKPHOS 54  --  51   ALT 18  --  26   AST 19  --  23   BILITOT 0.5  --  0.4     Microbiology Results (last 7 days)       Procedure Component Value Units Date/Time    Blood culture #2 **CANNOT BE ORDERED STAT** [[id number]]  (Normal) Collected: 07/07/24 1158    Order Status: Completed Specimen: Blood Updated: 07/08/24 1401     Blood Culture No Growth At 24 Hours    Respiratory Culture [[id number]] Collected: 07/07/24 2133    Order Status: Completed Specimen: Sputum, Expectorated Updated: 07/08/24 0738     Respiratory Culture Rare normal respiratory topher     GRAM STAIN Quality 3+      Few Gram Positive Rods      Rare Gram Negative Rods    Blood culture #1 **CANNOT BE ORDERED STAT** [[id number]]  (Abnormal) Collected: 07/07/24 1158    Order Status: Completed Specimen: Blood Updated: 07/08/24 0703     GRAM  STAIN Gram Positive Rods      Seen in gram stain of broth only      1 of 1 Aerobic bottle positive           See below for Radiology    Intake and Output:    Intake/Output Summary (Last 24 hours) at 7/8/2024 2040  Last data filed at 7/8/2024 1334  Gross per 24 hour   Intake 648 ml   Output 3 ml   Net 645 ml       Assessment/Plan:  Acute hypoxemic respiratory failure secondary to left perihilar community-acquired pneumonia   Blood cultures x1 Gram-positive rods  Acute kidney injury - resolved   Metabolic acidosis - persists   Hyperglycemia without the diagnosis diabetes- steroid induced versus diabetes  Tobacco use  Morbid obesity With BMI 53.2      Continue Rocephin and azithromycin- day 2  No leukocytosis,  low-grade fever of 99.8 degrees  Blood cultures x2-  1 bottles came back positive for Gram-positive rods, identification and susceptibility pending  Repeat blood culture in a.m. x2  Respiratory culture-rare normal respiratory topher  Patient was started on methyl prednisone 40 mg q 8, will change to prednisone 50 mg po daily starting tomorrow for 3 more doses as patient received morning IV dose today  DuoNeb q.6 scheduled, monitor for tachycardia due to albuterol  Currently on supplemental oxygen via nasal cannula at 2 liters/minute, wean as able  Encourage patient to quit smoking.  Patient neither agreed not disagreed to quit  Also advised patient to request her PCP to refer her for PFTs.  Patient informed that she does not have a PCP for the last 3 years.  Discussed we will request  to arrange for a PCP for her upon discharge  Need to rule out COPD versus restrictive lung disease  Resume appropriate home medications when deemed necessary   Morning CBC, BMP, blood cultures x2 ordered    VTE prophylaxis:    Lovenox 40 mg subQ q.12                      Patient condition:  Stable    Anticipated discharge and Disposition:   TBD       All diagnosis and differential diagnosis have been reviewed; assessment  and plan has been documented; I have personally reviewed the labs and test results that are presently available; I have reviewed the patients medication list; I have reviewed the consulting providers response and recommendations. I have reviewed or attempted to review medical records based upon their availability    All of the patient's questions have been  addressed and answered. Patient's is agreeable to the above stated plan. I will continue to monitor closely and make adjustments to medical management as needed.    Portions of this note dictated using EMR integrated voice recognition software, and may be subject to voice recognition errors not corrected at proofreading. Please contact writer for clarification if needed.   _____________________________________________________________________    Nutrition Status:   No RD assessment on chart  A minimum of two characteristics is recommended for diagnosis of either severe or non-severe malnutrition.     Current Med:   albuterol-ipratropium  3 mL Nebulization Q6H    azithromycin  500 mg Intravenous Q24H    cefTRIAXone (Rocephin) IV (PEDS and ADULTS)  1 g Intravenous Q24H    enoxparin  40 mg Subcutaneous Q12H (prophylaxis, 0900/2100)    guaiFENesin  600 mg Oral BID    [START ON 7/9/2024] predniSONE  50 mg Oral Daily      PRN meds:  Current Facility-Administered Medications:     acetaminophen, 650 mg, Oral, Q8H PRN    acetaminophen, 650 mg, Oral, Q4H PRN    dextrose 10%, 12.5 g, Intravenous, PRN    dextrose 10%, 25 g, Intravenous, PRN    glucagon (human recombinant), 1 mg, Intramuscular, PRN    glucose, 16 g, Oral, PRN    glucose, 24 g, Oral, PRN    guaiFENesin 100 mg/5 ml, 200 mg, Oral, Q4H PRN    ondansetron, 4 mg, Intravenous, Q4H PRN    sodium chloride 0.9%, 10 mL, Intravenous, Q12H PRN    Continuous infusion:       Radiology:   I have personally reviewed the images and agree with radiologist report  Echo    Left Ventricle: The left ventricle is normal in size.  Normal wall   thickness. There is normal systolic function with a visually estimated   ejection fraction of 60 - 65%.    Right Ventricle: Normal right ventricular cavity size. Wall thickness   is normal. Systolic function is normal.    Pulmonary Artery: No pulmonary hypertension. The estimated pulmonary   artery systolic pressure is 12 mmHg.    IVC/SVC: Normal venous pressure at 3 mmHg.        Carlos Souza MD  Department of Hospital Medicine   [facility]   07/08/2024

## 2024-07-09 LAB
ANION GAP SERPL CALC-SCNC: 12 MEQ/L
BACTERIA SPEC CULT: NORMAL
BUN SERPL-MCNC: 19 MG/DL (ref 7–18.7)
CALCIUM SERPL-MCNC: 8.5 MG/DL (ref 8.4–10.2)
CHLORIDE SERPL-SCNC: 105 MMOL/L (ref 98–107)
CO2 SERPL-SCNC: 19 MMOL/L (ref 22–29)
CREAT SERPL-MCNC: 0.92 MG/DL (ref 0.55–1.02)
CREAT/UREA NIT SERPL: 21
ERYTHROCYTE [DISTWIDTH] IN BLOOD BY AUTOMATED COUNT: 13.1 % (ref 11.5–17)
EST. AVERAGE GLUCOSE BLD GHB EST-MCNC: 119.8 MG/DL
GFR SERPLBLD CREATININE-BSD FMLA CKD-EPI: >60 ML/MIN/1.73/M2
GLUCOSE SERPL-MCNC: 127 MG/DL (ref 74–100)
GRAM STN SPEC: NORMAL
HBA1C MFR BLD: 5.8 %
HCT VFR BLD AUTO: 42.5 % (ref 37–47)
HGB BLD-MCNC: 13.7 G/DL (ref 12–16)
MCH RBC QN AUTO: 28.9 PG (ref 27–31)
MCHC RBC AUTO-ENTMCNC: 32.2 G/DL (ref 33–36)
MCV RBC AUTO: 89.7 FL (ref 80–94)
NRBC BLD AUTO-RTO: 0 %
PLATELET # BLD AUTO: 195 X10(3)/MCL (ref 130–400)
PMV BLD AUTO: 10.1 FL (ref 7.4–10.4)
POTASSIUM SERPL-SCNC: 3.9 MMOL/L (ref 3.5–5.1)
RBC # BLD AUTO: 4.74 X10(6)/MCL (ref 4.2–5.4)
SODIUM SERPL-SCNC: 136 MMOL/L (ref 136–145)
WBC # BLD AUTO: 8.82 X10(3)/MCL (ref 4.5–11.5)

## 2024-07-09 PROCEDURE — 99900031 HC PATIENT EDUCATION (STAT)

## 2024-07-09 PROCEDURE — 94640 AIRWAY INHALATION TREATMENT: CPT

## 2024-07-09 PROCEDURE — 83036 HEMOGLOBIN GLYCOSYLATED A1C: CPT | Performed by: INTERNAL MEDICINE

## 2024-07-09 PROCEDURE — 97161 PT EVAL LOW COMPLEX 20 MIN: CPT

## 2024-07-09 PROCEDURE — 85027 COMPLETE CBC AUTOMATED: CPT | Performed by: INTERNAL MEDICINE

## 2024-07-09 PROCEDURE — 36415 COLL VENOUS BLD VENIPUNCTURE: CPT | Performed by: INTERNAL MEDICINE

## 2024-07-09 PROCEDURE — 11000001 HC ACUTE MED/SURG PRIVATE ROOM

## 2024-07-09 PROCEDURE — 63600175 PHARM REV CODE 636 W HCPCS: Performed by: PHYSICIAN ASSISTANT

## 2024-07-09 PROCEDURE — 27000221 HC OXYGEN, UP TO 24 HOURS

## 2024-07-09 PROCEDURE — 25000003 PHARM REV CODE 250: Performed by: INTERNAL MEDICINE

## 2024-07-09 PROCEDURE — 25000003 PHARM REV CODE 250: Performed by: PHYSICIAN ASSISTANT

## 2024-07-09 PROCEDURE — 99900035 HC TECH TIME PER 15 MIN (STAT)

## 2024-07-09 PROCEDURE — 94760 N-INVAS EAR/PLS OXIMETRY 1: CPT

## 2024-07-09 PROCEDURE — 63600175 PHARM REV CODE 636 W HCPCS: Performed by: INTERNAL MEDICINE

## 2024-07-09 PROCEDURE — 25000242 PHARM REV CODE 250 ALT 637 W/ HCPCS: Performed by: INTERNAL MEDICINE

## 2024-07-09 PROCEDURE — 80048 BASIC METABOLIC PNL TOTAL CA: CPT | Performed by: INTERNAL MEDICINE

## 2024-07-09 RX ORDER — SODIUM BICARBONATE 650 MG/1
650 TABLET ORAL 2 TIMES DAILY
Status: DISCONTINUED | OUTPATIENT
Start: 2024-07-09 | End: 2024-07-10 | Stop reason: HOSPADM

## 2024-07-09 RX ADMIN — AZITHROMYCIN MONOHYDRATE 500 MG: 500 INJECTION, POWDER, LYOPHILIZED, FOR SOLUTION INTRAVENOUS at 12:07

## 2024-07-09 RX ADMIN — ENOXAPARIN SODIUM 40 MG: 40 INJECTION SUBCUTANEOUS at 08:07

## 2024-07-09 RX ADMIN — CEFTRIAXONE SODIUM 1 G: 1 INJECTION, POWDER, FOR SOLUTION INTRAMUSCULAR; INTRAVENOUS at 12:07

## 2024-07-09 RX ADMIN — PREDNISONE 50 MG: 10 TABLET ORAL at 09:07

## 2024-07-09 RX ADMIN — SODIUM BICARBONATE 650 MG TABLET 650 MG: at 08:07

## 2024-07-09 RX ADMIN — GUAIFENESIN 600 MG: 600 TABLET, EXTENDED RELEASE ORAL at 08:07

## 2024-07-09 RX ADMIN — SODIUM BICARBONATE 650 MG TABLET 650 MG: at 09:07

## 2024-07-09 RX ADMIN — IPRATROPIUM BROMIDE AND ALBUTEROL SULFATE 3 ML: 2.5; .5 SOLUTION RESPIRATORY (INHALATION) at 02:07

## 2024-07-09 RX ADMIN — IPRATROPIUM BROMIDE AND ALBUTEROL SULFATE 3 ML: 2.5; .5 SOLUTION RESPIRATORY (INHALATION) at 01:07

## 2024-07-09 RX ADMIN — GUAIFENESIN 600 MG: 600 TABLET, EXTENDED RELEASE ORAL at 09:07

## 2024-07-09 RX ADMIN — ENOXAPARIN SODIUM 40 MG: 40 INJECTION SUBCUTANEOUS at 09:07

## 2024-07-09 NOTE — PROGRESS NOTES
[facility] Hospital Medicine Progress Note        Chief Complaint: Inpatient Follow-up for acute hypoxemic respiratory failure due to community-acquired    HPI per admitting team: Yoli Saldaña is a 43 y.o. White female with a past medical history of morbid obesity. The patient presented to [facility] on 7/7/2024 with a primary complaint of shortness a breath which has been ongoing for the last few days, clear productive cough and fever of 2 days.  Fever on 07/05/2024 was 103.7 ° F. she denies complaints of chest pain, nausea, vomiting, diarrhea and abdominal pain.  She does admit to smoking 1 pack of cigarettes per day.  Upon presentation to the ED, temperature 98.8° F, heart rate 115, blood pressure 121/81, respiratory rate 19 and SpO2 92% on 2 L nasal cannula.  Labs with WBC 10.63, CO2 19, BUN/creatinine 13.3/1.12 (9.6/0.76 in 2023), BNP less than 10, troponin less than 1.01.  Influenza A/B and SARS-C chest x-ray with findings suggestive of developing left perihilar pneumonia.  OV-2 PCR negative.  ABG with pH 7.44, pCO2 33, PO2 64.  EKG sinus tachycardia with ventricular rate of 113.  Chest x-ray with findings suggestive of developing left perihilar pneumonia.  In ED patient received IV fluid hydration, Tylenol, Rocephin, azithromycin.  She was admitted to hospital medicine services for further medical management.    Interval Hx:   Patient is sitting in bed, reports started to feel better, still on supplemental oxygen via nasal cannula 2 liters/minute, noted her SpO2 was 97%, dropped to 1 L. SpO2 94%  Patient wants to go home.  Informed that 1 of the culture is not yet finalized and I have repeated her blood cultures today which need to be negative prior to discharge home.  No family is at bedside  Case was discussed with patient's nurse and  on the floor.    Objective/physical exam:  General: In no acute distress, afebrile, morbidly obese  Chest: Clear to auscultation  bilaterally anteriorly, supplemental oxygen via nasal cannula at 1 liters/minute  Heart: RRR, +S1, S2, no appreciable murmur  Abdomen: Soft, nontender, BS +  MSK: Warm, no lower extremity edema, no clubbing or cyanosis  Neurologic: Alert and oriented x4, Cranial nerve II-XII intact, Strength 5/5 in all 4 extremities  Psych:  Slightly depressed, sad    VITAL SIGNS: 24 HRS MIN & MAX LAST   Temp  Min: 97.2 °F (36.2 °C)  Max: 98.7 °F (37.1 °C) 97.2 °F (36.2 °C)   BP  Min: 125/67  Max: 146/81 127/73   Pulse  Min: 54  Max: 92  79   Resp  Min: 16  Max: 20 18   SpO2  Min: 91 %  Max: 96 % 96 %     I reviewed the labs below:  Recent Labs   Lab 07/07/24  1158 07/08/24  0559 07/09/24  0358   WBC 10.63 6.85 8.82   RBC 5.26 4.95 4.74   HGB 15.3 14.4 13.7   HCT 45.5 43.5 42.5   MCV 86.5 87.9 89.7   MCH 29.1 29.1 28.9   MCHC 33.6 33.1 32.2*   RDW 13.2 13.1 13.1    194 195   MPV 9.6 10.3 10.1     Recent Labs   Lab 07/07/24  1158 07/07/24  1427 07/08/24  0559 07/09/24  0358   *  --  135* 136   K 4.7  --  4.7 3.9     --  106 105   CO2 19*  --  19* 19*   BUN 13.3  --  14.7 19.0*   CREATININE 1.12*  --  0.85 0.92   CALCIUM 9.2  --  8.7 8.5   PH  --  7.440  --   --    ALBUMIN 3.8  --  3.5  --    ALKPHOS 54  --  51  --    ALT 18  --  26  --    AST 19  --  23  --    BILITOT 0.5  --  0.4  --      Microbiology Results (last 7 days)       Procedure Component Value Units Date/Time    Blood Culture [[id number]] Collected: 07/09/24 0358    Order Status: Sent Specimen: Blood from Arm, Right Updated: 07/09/24 0656    Blood Culture [[id number]] Collected: 07/09/24 0357    Order Status: Sent Specimen: Blood from Arm, Left     Blood culture #2 **CANNOT BE ORDERED STAT** [[id number]]  (Normal) Collected: 07/07/24 1158    Order Status: Completed Specimen: Blood Updated: 07/08/24 1401     Blood Culture No Growth At 24 Hours    Respiratory Culture [[id number]] Collected: 07/07/24 2133    Order Status: Completed Specimen: Sputum,  Expectorated Updated: 07/08/24 0738     Respiratory Culture Rare normal respiratory topher     GRAM STAIN Quality 3+      Few Gram Positive Rods      Rare Gram Negative Rods    Blood culture #1 **CANNOT BE ORDERED STAT** [[id number]]  (Abnormal) Collected: 07/07/24 1158    Order Status: Completed Specimen: Blood Updated: 07/08/24 0703     GRAM STAIN Gram Positive Rods      Seen in gram stain of broth only      1 of 1 Aerobic bottle positive           See below for Radiology    Intake and Output:    Intake/Output Summary (Last 24 hours) at 7/9/2024 0703  Last data filed at 7/8/2024 1334  Gross per 24 hour   Intake 480 ml   Output --   Net 480 ml       Assessment/Plan:  Acute hypoxemic respiratory failure secondary to left perihilar community-acquired pneumonia - improving  Blood cultures x1 Gram-positive rods  Acute kidney injury - resolved   Metabolic acidosis - persists   Hyperglycemia without the diagnosis diabetes- steroid induced versus diabetes  Tobacco use  Morbid obesity With BMI 53.2      Continue Rocephin and azithromycin- day 2  No leukocytosis,  AFEBRILE  Blood cultures x2-  1 bottles came back positive for Gram-positive rods, identification and susceptibility still pending after 48 hours and not finalized  7/9- Repeat blood culture in a.m. x2- in process  Respiratory culture-rare normal respiratory topher  Patient was started on methyl prednisone 40 mg q 8, will change to prednisone 50 mg po daily starting tomorrow for 3 more doses as patient received morning IV dose - day 3 of 5  DuoNeb q.6 scheduled, monitor for tachycardia due to albuterol  Currently on supplemental oxygen via nasal cannula at  liters/minute, wean as able  Encourage patient to quit smoking.  Patient reports it is really hard but she will try  Also advised patient to request her PCP to refer her for PFTs.  Patient informed that she does not have a PCP for the last 3 years.  Discussed we will request  to arrange for a PCP for  her upon discharge  Need to rule out COPD versus restrictive lung disease, she also needs sleep study to rule out obstructive sleep apnea  Hyperglycemia without the diagnosis diabetes- steroid induced versus diabetes- A1c 5.8, prediabetic, need to monitor her diet closely, lose weight.  Discussed with patient  HCO2 remain low at 19-> start bicarb tab 650 mg  P.O. B.I.D.   Resume appropriate home medications when deemed necessary   Morning BMP ordered    VTE prophylaxis:    Lovenox 40 mg subQ q.12                      Patient condition:  Stable    Anticipated discharge and Disposition:   1-2 days, still awaiting final blood cultures after 48 hours      All diagnosis and differential diagnosis have been reviewed; assessment and plan has been documented; I have personally reviewed the labs and test results that are presently available; I have reviewed the patients medication list; I have reviewed the consulting providers response and recommendations. I have reviewed or attempted to review medical records based upon their availability    All of the patient's questions have been  addressed and answered. Patient's is agreeable to the above stated plan. I will continue to monitor closely and make adjustments to medical management as needed.    Portions of this note dictated using EMR integrated voice recognition software, and may be subject to voice recognition errors not corrected at proofreading. Please contact writer for clarification if needed.   _____________________________________________________________________    Nutrition Status:   No RD assessment on chart  A minimum of two characteristics is recommended for diagnosis of either severe or non-severe malnutrition.     Current Med:   albuterol-ipratropium  3 mL Nebulization Q6H    azithromycin  500 mg Intravenous Q24H    cefTRIAXone (Rocephin) IV (PEDS and ADULTS)  1 g Intravenous Q24H    enoxparin  40 mg Subcutaneous Q12H (prophylaxis, 0900/2100)    guaiFENesin   600 mg Oral BID    predniSONE  50 mg Oral Daily      PRN meds:  Current Facility-Administered Medications:     acetaminophen, 650 mg, Oral, Q8H PRN    acetaminophen, 650 mg, Oral, Q4H PRN    dextrose 10%, 12.5 g, Intravenous, PRN    dextrose 10%, 25 g, Intravenous, PRN    glucagon (human recombinant), 1 mg, Intramuscular, PRN    glucose, 16 g, Oral, PRN    glucose, 24 g, Oral, PRN    guaiFENesin 100 mg/5 ml, 200 mg, Oral, Q4H PRN    ondansetron, 4 mg, Intravenous, Q4H PRN    sodium chloride 0.9%, 10 mL, Intravenous, Q12H PRN    Continuous infusion:       Radiology:   I have personally reviewed the images and agree with radiologist report  Echo    Left Ventricle: The left ventricle is normal in size. Normal wall   thickness. There is normal systolic function with a visually estimated   ejection fraction of 60 - 65%.    Right Ventricle: Normal right ventricular cavity size. Wall thickness   is normal. Systolic function is normal.    Pulmonary Artery: No pulmonary hypertension. The estimated pulmonary   artery systolic pressure is 12 mmHg.    IVC/SVC: Normal venous pressure at 3 mmHg.        Carlos Souza MD  Department of Hospital Medicine   [facility]   07/09/2024

## 2024-07-09 NOTE — PT/OT/SLP EVAL
Physical Therapy Evaluation and Discharge Note    Patient Name:  Yoli Saldaña   MRN:  [id number]    Recommendations:     Discharge therapy intensity: No Therapy Indicated   Discharge Equipment Recommendations: none   Barriers to discharge: None    Assessment:     Yoli Saldaña is a 43 y.o. female admitted with a medical diagnosis of CAP, GERBER, metabolic acidosis, tobacco use, obesity.  At this time, patient is functioning at their prior level of function and does not require further acute PT services.     Recent Surgery: * No surgery found *      Plan:     During this hospitalization, patient does not require further acute PT services.  Please re-consult if situation changes.      Subjective     Chief Complaint: wanting to go home  Patient/Family Comments/goals: to go home  Pain/Comfort:  Pain Rating 1: 0/10    Patients cultural, spiritual, Yarsani conflicts given the current situation: no    Living Environment:  Lives with daughter and son in  law.   Prior to admission, patients level of function was independent.  Equipment used at home: none.  DME owned (not currently used): none.  Upon discharge, patient will have assistance from family.    Objective:     Communicated with nurse prior to session.  Patient found supine with   upon PT entry to room.    General Precautions: Standard,      Orthopedic Precautions:    Braces: N/A  Respiratory Status: Room air  SpO2 98% before ambulation, and 96% afterwards.    Exams:  Cognitive Exam:  Patient is oriented to Person, Place, Time, and Situation  RLE ROM: WNL  RLE Strength: WNL  LLE ROM: WNL  LLE Strength: WNL    Functional Mobility:  Bed Mobility:     Supine to Sit: independence  Sit to Supine: independence  Transfers:     Sit to Stand:  independence with no AD  Gait: 200ft with no AD, independently. No lob.     AM-PAC 6 CLICK MOBILITY  Total Score:24       Treatment and Education:    Patient provided with verbal education education regarding PT role/goals/POC.   Understanding was verbalized.     Patient left supine with all lines intact and call button in reach.    GOALS:   Multidisciplinary Problems       Physical Therapy Goals       Not on file                    History:     History reviewed. No pertinent past medical history.    No past surgical history on file.    Time Tracking:     PT Received On: 07/09/24  PT Start Time: 1028     PT Stop Time: 1045  PT Total Time (min): 17 min     Billable Minutes: Evaluation 17      07/09/2024

## 2024-07-09 NOTE — NURSING
Nurses Note -- 4 Eyes      7/9/2024   2:33 AM      Skin assessed during: Admit      [x] No Altered Skin Integrity Present    []Prevention Measures Documented      [] Yes- Altered Skin Integrity Present or Discovered   [] LDA Added if Not in Epic (Describe Wound)   [] New Altered Skin Integrity was Present on Admit and Documented in LDA   [] Wound Image Taken    Wound Care Consulted? No    Attending Nurse:  Georgiana Lerma RN/Staff Member:   Maye

## 2024-07-09 NOTE — PLAN OF CARE
07/09/24 1630   Discharge Assessment   Assessment Type Discharge Planning Assessment   Confirmed/corrected address, phone number and insurance Yes   Confirmed Demographics Correct on Facesheet   Source of Information patient   Communicated ROBERTO with patient/caregiver Date not available/Unable to determine   Reason For Admission SOB, pneumonia   People in Home child(huyen), adult  (daughter and son-in-law)   Do you expect to return to your current living situation? Yes   Do you have help at home or someone to help you manage your care at home? Yes   Who are your caregiver(s) and their phone number(s)? mother Jeannette Preciado [phone]   Prior to hospitilization cognitive status: Alert/Oriented   Current cognitive status: Alert/Oriented   Walking or Climbing Stairs Difficulty no   Dressing/Bathing Difficulty no   Home Accessibility stairs to enter home   Number of Stairs, Main Entrance one   Stair Railings, Main Entrance none   Equipment Currently Used at Home none   Patient currently being followed by outpatient case management? No   Do you currently have service(s) that help you manage your care at home? No   Do you take prescription medications? Yes   Do you have prescription coverage? Yes   Coverage BCBS   Do you have any problems affording any of your prescribed medications? No   Is the patient taking medications as prescribed? yes   Who is going to help you get home at discharge? mother   How do you get to doctors appointments? family or friend will provide   Are you on dialysis? No   Do you take coumadin? No   Discharge Plan A Home   Discharge Plan B Home   DME Needed Upon Discharge  none   Discharge Plan discussed with: Patient   Transition of Care Barriers None   OTHER   Name(s) of People in Home daughter and son-in-law

## 2024-07-10 VITALS
HEIGHT: 67 IN | RESPIRATION RATE: 18 BRPM | TEMPERATURE: 98 F | DIASTOLIC BLOOD PRESSURE: 83 MMHG | BODY MASS INDEX: 45.99 KG/M2 | HEART RATE: 66 BPM | WEIGHT: 293 LBS | OXYGEN SATURATION: 92 % | SYSTOLIC BLOOD PRESSURE: 131 MMHG

## 2024-07-10 PROBLEM — R73.03 PREDIABETES: Status: ACTIVE | Noted: 2024-07-10

## 2024-07-10 PROBLEM — J18.9 PNEUMONIA OF LEFT LUNG DUE TO INFECTIOUS ORGANISM: Status: ACTIVE | Noted: 2024-07-10

## 2024-07-10 LAB
ANION GAP SERPL CALC-SCNC: 8 MEQ/L
BACTERIA BLD CULT: ABNORMAL
BUN SERPL-MCNC: 14.1 MG/DL (ref 7–18.7)
CALCIUM SERPL-MCNC: 9.1 MG/DL (ref 8.4–10.2)
CHLORIDE SERPL-SCNC: 107 MMOL/L (ref 98–107)
CO2 SERPL-SCNC: 22 MMOL/L (ref 22–29)
CREAT SERPL-MCNC: 0.84 MG/DL (ref 0.55–1.02)
CREAT/UREA NIT SERPL: 17
GFR SERPLBLD CREATININE-BSD FMLA CKD-EPI: >60 ML/MIN/1.73/M2
GLUCOSE SERPL-MCNC: 100 MG/DL (ref 74–100)
GRAM STN SPEC: ABNORMAL
POTASSIUM SERPL-SCNC: 4.3 MMOL/L (ref 3.5–5.1)
SODIUM SERPL-SCNC: 137 MMOL/L (ref 136–145)

## 2024-07-10 PROCEDURE — 80048 BASIC METABOLIC PNL TOTAL CA: CPT | Performed by: INTERNAL MEDICINE

## 2024-07-10 PROCEDURE — 36415 COLL VENOUS BLD VENIPUNCTURE: CPT | Performed by: INTERNAL MEDICINE

## 2024-07-10 PROCEDURE — 63600175 PHARM REV CODE 636 W HCPCS: Performed by: PHYSICIAN ASSISTANT

## 2024-07-10 PROCEDURE — 25000003 PHARM REV CODE 250: Performed by: INTERNAL MEDICINE

## 2024-07-10 PROCEDURE — 25000003 PHARM REV CODE 250: Performed by: PHYSICIAN ASSISTANT

## 2024-07-10 PROCEDURE — 63600175 PHARM REV CODE 636 W HCPCS: Performed by: INTERNAL MEDICINE

## 2024-07-10 RX ORDER — BENZONATATE 100 MG/1
100 CAPSULE ORAL 3 TIMES DAILY PRN
Qty: 20 CAPSULE | Refills: 0 | Status: SHIPPED | OUTPATIENT
Start: 2024-07-10 | End: 2024-07-20

## 2024-07-10 RX ORDER — GUAIFENESIN 600 MG/1
600 TABLET, EXTENDED RELEASE ORAL 2 TIMES DAILY
Qty: 20 TABLET | Refills: 0 | Status: SHIPPED | OUTPATIENT
Start: 2024-07-10 | End: 2024-07-20

## 2024-07-10 RX ORDER — AZITHROMYCIN 500 MG/1
500 TABLET, FILM COATED ORAL DAILY
Qty: 3 TABLET | Refills: 0 | Status: SHIPPED | OUTPATIENT
Start: 2024-07-10 | End: 2024-07-13

## 2024-07-10 RX ORDER — PREDNISONE 50 MG/1
50 TABLET ORAL DAILY
Qty: 1 TABLET | Refills: 0 | Status: SHIPPED | OUTPATIENT
Start: 2024-07-11 | End: 2024-07-12

## 2024-07-10 RX ORDER — DEXTROMETHORPHAN HBR AND GUAIFENESIN 5; 100 MG/5ML; MG/5ML
10 LIQUID ORAL EVERY 6 HOURS PRN
COMMUNITY
Start: 2024-07-10 | End: 2024-07-20

## 2024-07-10 RX ADMIN — CEFTRIAXONE SODIUM 1 G: 1 INJECTION, POWDER, FOR SOLUTION INTRAMUSCULAR; INTRAVENOUS at 12:07

## 2024-07-10 RX ADMIN — SODIUM BICARBONATE 650 MG TABLET 650 MG: at 08:07

## 2024-07-10 RX ADMIN — AZITHROMYCIN MONOHYDRATE 500 MG: 500 INJECTION, POWDER, LYOPHILIZED, FOR SOLUTION INTRAVENOUS at 01:07

## 2024-07-10 RX ADMIN — PREDNISONE 50 MG: 10 TABLET ORAL at 08:07

## 2024-07-10 RX ADMIN — ENOXAPARIN SODIUM 40 MG: 40 INJECTION SUBCUTANEOUS at 08:07

## 2024-07-10 RX ADMIN — GUAIFENESIN 600 MG: 600 TABLET, EXTENDED RELEASE ORAL at 08:07

## 2024-07-10 NOTE — DISCHARGE SUMMARY
[facility] Hospital Medicine Discharge Summary    Admit Date: 7/7/2024  Discharge Date and Time: 7/10/47488:59 PM  Admitting Physician:  Team  Discharging Physician: Carlos Souza MD.  Primary Care Physician: Tj Lares NP  Consults: None    Discharge Diagnoses:  Acute hypoxemic respiratory failure secondary to left perihilar community-acquired pneumonia - improving  Blood cultures x1 Gram-positive rods  Acute kidney injury - resolved   Metabolic acidosis - persists   Hyperglycemia without the diagnosis diabetes- steroid induced versus diabetes  Tobacco use  Prediabetes with A1C 5.8  Morbid obesity With BMI 53.2    Hospital Course:   Yoli Saldaña is a 43 y.o. White female with a past medical history of morbid obesity. The patient presented to [facility] on 7/7/2024 with a primary complaint of shortness a breath which has been ongoing for the last few days, clear productive cough and fever of 2 days.  Fever on 07/05/2024 was 103.7 ° F. she denies complaints of chest pain, nausea, vomiting, diarrhea and abdominal pain.  She does admit to smoking 1 pack of cigarettes per day.  Upon presentation to the ED, temperature 98.8° F, heart rate 115, blood pressure 121/81, respiratory rate 19 and SpO2 92% on 2 L nasal cannula.  Labs with WBC 10.63, CO2 19, BUN/creatinine 13.3/1.12 (9.6/0.76 in 2023), BNP less than 10, troponin less than 1.01.  Influenza A/B and SARS-C chest x-ray with findings suggestive of developing left perihilar pneumonia.  OV-2 PCR negative.  ABG with pH 7.44, pCO2 33, PO2 64.  EKG sinus tachycardia with ventricular rate of 113.  Chest x-ray with findings suggestive of developing left perihilar pneumonia.  In ED patient received IV fluid hydration, Tylenol, Rocephin, azithromycin.  She was admitted to hospital medicine services for further medical management.  Continue Rocephin and azithromycin- day 3, will switch to azithromycin 500 mg p.o. daily to complete 5 days'  course  No leukocytosis,  AFEBRILE  Blood cultures x2-  1 bottles came back positive for Gram-positive rods, case personally discussed with our micro department, unable to identify the Gram-positive rods, possible contaminant but they will send it to [facility].  She will reach out to me once the final result is available and if abnormal, will reach out to the patient.  Second bottle from initial culture-negative at 72 hours  7/9- Repeat blood culture in a.m. x2-negative at 24 hours  Respiratory culture-rare normal respiratory topher  Continue prednisone 50 mg daily, day 4 of 5,  Patient has been on room air since yesterday morning.  Encourage patient to quit smoking.  Patient reports it is really hard but she will try  Also advised patient to request her PCP to refer her for PFTs.  Patient informed that she does not have a PCP for the last 3 years.  Discussed we will request  to arrange for a PCP for her upon discharge  Need to rule out COPD versus restrictive lung disease, she also needs sleep study to rule out obstructive sleep apnea  Hyperglycemia noted- A1c 5.8, prediabetic, need to monitor her diet closely, lose weight.  Discussed low carb diet with the patient  Bicarb normalized  Will discharge patient home, will await her final cultures from [facility].  Currently afebrile, no leukocytosis.  Again encourage patient to quit smoking    Pt was seen and examined on the day of discharge    Vitals:  VITAL SIGNS: 24 HRS MIN & MAX LAST   Temp  Min: 97.7 °F (36.5 °C)  Max: 98.2 °F (36.8 °C) 97.8 °F (36.6 °C)   BP  Min: 110/62  Max: 133/83 131/83   Pulse  Min: 66  Max: 80  66   Resp  Min: 18  Max: 20 18   SpO2  Min: 92 %  Max: 97 % (!) 92 %       Physical Exam:  General: In no acute distress, afebrile, morbidly obese  Chest: Clear to auscultation bilaterally anteriorly, supplemental oxygen via nasal cannula at 1 liters/minute  Heart: RRR, +S1, S2, no appreciable murmur  Abdomen: Soft, nontender, BS  +  MSK: Warm, no lower extremity edema, no clubbing or cyanosis  Neurologic: Alert and oriented x4, Cranial nerve II-XII intact, Strength 5/5 in all 4 extremities  Psych:  Appropriate mood and affect    Recent Labs   Lab 07/07/24  1158 07/08/24  0559 07/09/24  0358   WBC 10.63 6.85 8.82   RBC 5.26 4.95 4.74   HGB 15.3 14.4 13.7   HCT 45.5 43.5 42.5   MCV 86.5 87.9 89.7   MCH 29.1 29.1 28.9   MCHC 33.6 33.1 32.2*   RDW 13.2 13.1 13.1    194 195   MPV 9.6 10.3 10.1       Recent Labs   Lab 07/07/24  1158 07/07/24  1427 07/08/24 0559 07/09/24  0358 07/10/24  0513   *  --  135* 136 137   K 4.7  --  4.7 3.9 4.3     --  106 105 107   CO2 19*  --  19* 19* 22   BUN 13.3  --  14.7 19.0* 14.1   CREATININE 1.12*  --  0.85 0.92 0.84   CALCIUM 9.2  --  8.7 8.5 9.1   PH  --  7.440  --   --   --    ALBUMIN 3.8  --  3.5  --   --    ALKPHOS 54  --  51  --   --    ALT 18  --  26  --   --    AST 19  --  23  --   --    BILITOT 0.5  --  0.4  --   --         Microbiology Results (last 7 days)       Procedure Component Value Units Date/Time    Blood Culture [[id number]]  (Normal) Collected: 07/09/24 0357    Order Status: Completed Specimen: Blood from Arm, Left Updated: 07/10/24 0801     Blood Culture No Growth At 24 Hours    Blood Culture [[id number]]  (Normal) Collected: 07/09/24 0358    Order Status: Completed Specimen: Blood from Arm, Right Updated: 07/10/24 0801     Blood Culture No Growth At 24 Hours    Blood culture #2 **CANNOT BE ORDERED STAT** [[id number]]  (Normal) Collected: 07/07/24 1158    Order Status: Completed Specimen: Blood Updated: 07/09/24 1401     Blood Culture No Growth At 48 Hours    Blood culture #1 **CANNOT BE ORDERED STAT** [[id number]]  (Abnormal) Collected: 07/07/24 1158    Order Status: Completed Specimen: Blood Updated: 07/09/24 1342     Blood Culture Gram-positive Rods     Comment: Identification To Follow        GRAM STAIN Gram Positive Rods      Seen in gram stain of broth only      1 of  1 Aerobic bottle positive    Respiratory Culture [[id number]] Collected: 07/07/24 2133    Order Status: Completed Specimen: Sputum, Expectorated Updated: 07/09/24 0847     Respiratory Culture Normal respiratory topher     GRAM STAIN Quality 3+      Few Gram Positive Rods      Rare Gram Negative Rods             Echo    Left Ventricle: The left ventricle is normal in size. Normal wall   thickness. There is normal systolic function with a visually estimated   ejection fraction of 60 - 65%.    Right Ventricle: Normal right ventricular cavity size. Wall thickness   is normal. Systolic function is normal.    Pulmonary Artery: No pulmonary hypertension. The estimated pulmonary   artery systolic pressure is 12 mmHg.    IVC/SVC: Normal venous pressure at 3 mmHg.         Medication List        START taking these medications      azithromycin 500 MG tablet  Commonly known as: ZITHROMAX  Take 1 tablet (500 mg total) by mouth once daily. for 3 days     dextromethorphan-guaiFENesin 5-100 mg/5 mL Liqd  Take 10 mLs by mouth every 6 (six) hours as needed (cough).     guaiFENesin 600 mg 12 hr tablet  Commonly known as: MUCINEX  Take 1 tablet (600 mg total) by mouth 2 (two) times daily. for 10 days     predniSONE 50 MG Tab  Commonly known as: DELTASONE  Take 1 tablet (50 mg total) by mouth once daily. for 1 day  Start taking on: July 11, 2024            CONTINUE taking these medications      benzonatate 100 MG capsule  Commonly known as: TESSALON PERLES  Take 1 capsule (100 mg total) by mouth 3 (three) times daily as needed for Cough.     ondansetron 4 MG Tbdl  Commonly known as: ZOFRAN-ODT  Take 1 tablet (4 mg total) by mouth every 8 (eight) hours as needed (nausea vomiting).            STOP taking these medications      amoxicillin-clavulanate 875-125mg 875-125 mg per tablet  Commonly known as: AUGMENTIN     HYDROcodone-acetaminophen 5-325 mg per tablet  Commonly known as: NORCO               Where to Get Your Medications         These medications were sent to [id number] - [place] - [address] AT [facility] [address]  [address] [address]      Phone: [phone]   azithromycin 500 MG tablet  benzonatate 100 MG capsule  guaiFENesin 600 mg 12 hr tablet  predniSONE 50 MG Tab       You can get these medications from any pharmacy    You don't need a prescription for these medications  dextromethorphan-guaiFENesin 5-100 mg/5 mL Liqd          Explained in detail to the patient about the discharge plan, medications, and follow-up visits. Pt understands and agrees with the treatment plan  Discharge Disposition: Home or Self Care   Discharged Condition: stable  Diet- low carb diet     Medications Per DC med rec  Activities as tolerated   Follow-up Information       Tj Lares NP .    Specialty: Family Medicine  Why: In 2 weeks, please request sleep study for evaluation of obstructive sleep apnea  Contact information:  [address] [address] [phone]                           For further questions contact hospitalist office    Discharge time 34 minutes    For worsening symptoms, chest pain, shortness of breath, increased abdominal pain, high grade fever, stroke or stroke like symptoms, immediately go to the nearest Emergency Room or call 911 as soon as possible.    Portions of this note dictated using EMR integrated voice recognition software, and may be subject to voice recognition errors not corrected at proofreading. Please contact writer for clarification if needed    Carlos Souza MD  Department of Hospital Medicine   [facility]   07/10/2024 1:59 PM

## 2024-07-12 ENCOUNTER — PATIENT MESSAGE (OUTPATIENT)
Dept: ADMINISTRATIVE | Facility: OTHER | Age: 44
End: 2024-07-12
Payer: COMMERCIAL

## 2024-07-12 LAB — BACTERIA BLD CULT: NORMAL

## 2024-07-13 LAB
BACTERIA BLD CULT: NORMAL
BACTERIA BLD CULT: NORMAL

## 2024-07-15 ENCOUNTER — PATIENT MESSAGE (OUTPATIENT)
Dept: FAMILY MEDICINE | Facility: CLINIC | Age: 44
End: 2024-07-15
Payer: COMMERCIAL

## 2024-07-16 ENCOUNTER — PATIENT MESSAGE (OUTPATIENT)
Dept: ADMINISTRATIVE | Facility: OTHER | Age: 44
End: 2024-07-16
Payer: COMMERCIAL

## 2024-07-23 ENCOUNTER — PATIENT MESSAGE (OUTPATIENT)
Dept: ADMINISTRATIVE | Facility: OTHER | Age: 44
End: 2024-07-23
Payer: COMMERCIAL

## 2024-07-23 ENCOUNTER — OFFICE VISIT (OUTPATIENT)
Dept: FAMILY MEDICINE | Facility: CLINIC | Age: 44
End: 2024-07-23
Payer: COMMERCIAL

## 2024-07-23 VITALS
HEART RATE: 106 BPM | DIASTOLIC BLOOD PRESSURE: 86 MMHG | RESPIRATION RATE: 20 BRPM | WEIGHT: 293 LBS | BODY MASS INDEX: 45.99 KG/M2 | OXYGEN SATURATION: 97 % | HEIGHT: 67 IN | SYSTOLIC BLOOD PRESSURE: 124 MMHG | TEMPERATURE: 98 F

## 2024-07-23 DIAGNOSIS — Z13.220 ENCOUNTER FOR LIPID SCREENING FOR CARDIOVASCULAR DISEASE: ICD-10-CM

## 2024-07-23 DIAGNOSIS — F17.210 CIGARETTE NICOTINE DEPENDENCE WITHOUT COMPLICATION: ICD-10-CM

## 2024-07-23 DIAGNOSIS — Z12.31 ENCOUNTER FOR SCREENING MAMMOGRAM FOR BREAST CANCER: ICD-10-CM

## 2024-07-23 DIAGNOSIS — J30.2 SEASONAL ALLERGIC RHINITIS, UNSPECIFIED TRIGGER: ICD-10-CM

## 2024-07-23 DIAGNOSIS — Z11.59 NEED FOR HEPATITIS C SCREENING TEST: ICD-10-CM

## 2024-07-23 DIAGNOSIS — G47.10 HYPERSOMNOLENCE: ICD-10-CM

## 2024-07-23 DIAGNOSIS — E66.01 MORBID OBESITY WITH BMI OF 50.0-59.9, ADULT: ICD-10-CM

## 2024-07-23 DIAGNOSIS — Z13.6 ENCOUNTER FOR LIPID SCREENING FOR CARDIOVASCULAR DISEASE: ICD-10-CM

## 2024-07-23 DIAGNOSIS — N17.9 AKI (ACUTE KIDNEY INJURY): ICD-10-CM

## 2024-07-23 DIAGNOSIS — G89.4 CHRONIC PAIN SYNDROME: ICD-10-CM

## 2024-07-23 DIAGNOSIS — M79.10 MYALGIA: ICD-10-CM

## 2024-07-23 DIAGNOSIS — Z09 HOSPITAL DISCHARGE FOLLOW-UP: Primary | ICD-10-CM

## 2024-07-23 DIAGNOSIS — J18.9 PNEUMONIA OF LEFT LUNG DUE TO INFECTIOUS ORGANISM, UNSPECIFIED PART OF LUNG: ICD-10-CM

## 2024-07-23 DIAGNOSIS — R06.02 SHORTNESS OF BREATH: ICD-10-CM

## 2024-07-23 DIAGNOSIS — R06.81 APNEIC SPELL: ICD-10-CM

## 2024-07-23 DIAGNOSIS — Z11.4 ENCOUNTER FOR SCREENING FOR HIV: ICD-10-CM

## 2024-07-23 DIAGNOSIS — R73.03 PREDIABETES: ICD-10-CM

## 2024-07-23 DIAGNOSIS — Z00.00 ENCOUNTER FOR WELLNESS EXAMINATION: ICD-10-CM

## 2024-07-23 PROCEDURE — 99495 TRANSJ CARE MGMT MOD F2F 14D: CPT | Mod: ,,, | Performed by: FAMILY MEDICINE

## 2024-07-23 PROCEDURE — 1159F MED LIST DOCD IN RCRD: CPT | Mod: CPTII,,, | Performed by: FAMILY MEDICINE

## 2024-07-23 PROCEDURE — 1111F DSCHRG MED/CURRENT MED MERGE: CPT | Mod: CPTII,,, | Performed by: FAMILY MEDICINE

## 2024-07-23 PROCEDURE — 1160F RVW MEDS BY RX/DR IN RCRD: CPT | Mod: CPTII,,, | Performed by: FAMILY MEDICINE

## 2024-07-23 PROCEDURE — 3044F HG A1C LEVEL LT 7.0%: CPT | Mod: CPTII,,, | Performed by: FAMILY MEDICINE

## 2024-07-23 PROCEDURE — 3074F SYST BP LT 130 MM HG: CPT | Mod: CPTII,,, | Performed by: FAMILY MEDICINE

## 2024-07-23 PROCEDURE — 3079F DIAST BP 80-89 MM HG: CPT | Mod: CPTII,,, | Performed by: FAMILY MEDICINE

## 2024-07-23 RX ORDER — CETIRIZINE HYDROCHLORIDE 10 MG/1
1 TABLET ORAL EVERY MORNING
COMMUNITY

## 2024-07-23 RX ORDER — FLUTICASONE PROPIONATE 50 MCG
1 SPRAY, SUSPENSION (ML) NASAL 2 TIMES DAILY
Qty: 16 G | Refills: 11 | Status: SHIPPED | OUTPATIENT
Start: 2024-07-23 | End: 2024-08-22

## 2024-07-23 NOTE — PROGRESS NOTES
Yoli Saldaña  07/23/2024  [id number]    Adriana Chavez MD  Patient Care Team:  Adriana Chavez MD as PCP - General (Family Medicine)      Chief Complaint:  Chief Complaint   Patient presents with    Establish Care     Needs PCP-Hospital Discharge Follow up       History of Present Illness:    43 y.o. female who presents today to establish care and for hospital discharge follow up. She was admitted to the hospital for respiratory failure secondary to CAP. She required nasal cannula during most of her stay. She is a smoker who smokes 1 ppd. She received IV abx. Blood cultures neg at 5 days. Respiratory culture grew out normal topher only. She did have an GERBER and metabolic acidosis that resolved.     She does snore and has have had witnessed apneic spells. Hospers score of 13.     Also c/o chronic myalgias all over but mostly in anterior thighs and bilateral shoulders. States she has had a + SEBASTIAN in the past. NO FH of rheumatological disease. NO joint pains.     Also c/o nasal congestion and sneezing for a few days. Taking zyrtec daily.     Since discharge, patient is feeling fine. She completed oral abx. NO f/c, sob or wheezing. She does still have a dry cough. She does still smoke.          [facility]  Hospital Medicine Discharge Summary     Admit Date: 7/7/2024  Discharge Date and Time: 7/10/38384:59 PM  Admitting Physician:  Team  Discharging Physician: Carlos Souza MD.  Primary Care Physician: Tj Lares NP  Consults: None     Discharge Diagnoses:  Acute hypoxemic respiratory failure secondary to left perihilar community-acquired pneumonia - improving  Blood cultures x1 Gram-positive rods  Acute kidney injury - resolved   Metabolic acidosis - persists   Hyperglycemia without the diagnosis diabetes- steroid induced versus diabetes  Tobacco use  Prediabetes with A1C 5.8  Morbid obesity With BMI 53.2     Hospital Course:   Yoli Saldaña is a 43 y.o. White  female with a past medical history of morbid obesity. The patient presented to [facility] on 7/7/2024 with a primary complaint of shortness a breath which has been ongoing for the last few days, clear productive cough and fever of 2 days.  Fever on 07/05/2024 was 103.7 ° F. she denies complaints of chest pain, nausea, vomiting, diarrhea and abdominal pain.  She does admit to smoking 1 pack of cigarettes per day.  Upon presentation to the ED, temperature 98.8° F, heart rate 115, blood pressure 121/81, respiratory rate 19 and SpO2 92% on 2 L nasal cannula.  Labs with WBC 10.63, CO2 19, BUN/creatinine 13.3/1.12 (9.6/0.76 in 2023), BNP less than 10, troponin less than 1.01.  Influenza A/B and SARS-C chest x-ray with findings suggestive of developing left perihilar pneumonia.  OV-2 PCR negative.  ABG with pH 7.44, pCO2 33, PO2 64.  EKG sinus tachycardia with ventricular rate of 113.  Chest x-ray with findings suggestive of developing left perihilar pneumonia.  In ED patient received IV fluid hydration, Tylenol, Rocephin, azithromycin.  She was admitted to hospital medicine services for further medical management.  Continue Rocephin and azithromycin- day 3, will switch to azithromycin 500 mg p.o. daily to complete 5 days' course  No leukocytosis,  AFEBRILE  Blood cultures x2-  1 bottles came back positive for Gram-positive rods, case personally discussed with our micro department, unable to identify the Gram-positive rods, possible contaminant but they will send it to [facility].  She will reach out to me once the final result is available and if abnormal, will reach out to the patient.  Second bottle from initial culture-negative at 72 hours  7/9- Repeat blood culture in a.m. x2-negative at 24 hours  Respiratory culture-rare normal respiratory topher  Continue prednisone 50 mg daily, day 4 of 5,  Patient has been on room air since yesterday morning.  Encourage patient to quit smoking.  Patient reports it is really hard but  she will try  Also advised patient to request her PCP to refer her for PFTs.  Patient informed that she does not have a PCP for the last 3 years.  Discussed we will request  to arrange for a PCP for her upon discharge  Need to rule out COPD versus restrictive lung disease, she also needs sleep study to rule out obstructive sleep apnea  Hyperglycemia noted- A1c 5.8, prediabetic, need to monitor her diet closely, lose weight.  Discussed low carb diet with the patient  Bicarb normalized  Will discharge patient home, will await her final cultures from [facility].  Currently afebrile, no leukocytosis.  Again encourage patient to quit smoking             Family and/or Caretaker present at visit?  No.  Diagnostic tests reviewed/disposition: No diagnosic tests pending after this hospitalization.  Disease/illness education: yes  Home health/community services discussion/referrals: Patient does not have home health established from hospital visit.  They do not need home health.  If needed, we will set up home health for the patient.   Establishment or re-establishment of referral orders for community resources: No other necessary community resources.   Discussion with other health care providers: No discussion with other health care providers necessary.  I reviewed and reconciled the medications from hospital discharge.      Review of Systems  General: denies f/c, weight loss, night sweats, decreased appetite  Eye: denies blurred vision, changes in vision  Respiratory: denies sob, wheezing, cough  Cardiovascular: denies chest pain, palpitations, edema  Gastrointestinal: denies abdominal pain, n/v, constipation, diarrhea  Integumentary: denies rashes, pruritis    Past Medical History  History reviewed. No pertinent past medical history.    Medications  Medication List with Changes/Refills   New Medications    FLUTICASONE PROPIONATE (FLONASE) 50 MCG/ACTUATION NASAL SPRAY    1 spray (50 mcg total) by Each Nostril  "route 2 (two) times a day.   Current Medications    CETIRIZINE (ZYRTEC) 10 MG TABLET    Take 1 tablet by mouth every morning.   Discontinued Medications    ONDANSETRON (ZOFRAN-ODT) 4 MG TBDL    Take 1 tablet (4 mg total) by mouth every 8 (eight) hours as needed (nausea vomiting).       Past Surgical History:   Procedure Laterality Date    COSMETIC SURGERY  1998    Facial reconstruction    DENTAL SURGERY      EYE SURGERY  1986    HYSTERECTOMY  2018    TUBAL LIGATION  2000       SUBJECTIVE:  Health Maintenance  Health Maintenance Topics with due status: Not Due       Topic Last Completion Date    Hemoglobin A1c (Prediabetes) 07/09/2024    Influenza Vaccine Not Due     Health Maintenance Due   Topic Date Due    Hepatitis C Screening  Never done    Lipid Panel  Never done    Pneumococcal Vaccines (Age 0-64) (1 of 2 - PCV) Never done    HIV Screening  Never done    TETANUS VACCINE  Never done    Mammogram  06/06/2023    COVID-19 Vaccine (3 - 2023-24 season) 09/01/2023    Colorectal Cancer Screening  Never done       Exam:  Vitals:    07/23/24 0753   BP: 124/86   BP Location: Left forearm   Patient Position: Sitting   BP Method: Large (Automatic)   Pulse: 106   Resp: 20   Temp: 98.3 °F (36.8 °C)   TempSrc: Oral   SpO2: 97%   Weight: (!) 163.5 kg (360 lb 6.4 oz)   Height: 5' 7" (1.702 m)     Weight: (!) 163.5 kg (360 lb 6.4 oz)   Body mass index is 56.45 kg/m².      Physical Exam  Constitutional: NAD, alert, pleasant  Respiratory: CTAB, no wheezes, rales or rhonchi. No accessory muscle use  Eyes: EOMI  Cardiovascular: RRR, No m/r/g. No JVD. No LE edema  Gastrointestinal: BS+, nontender, nondistended  Integumentary: warm, dry, intact  Psych: AA&Ox3      ICD-10-CM ICD-9-CM   1. Hospital discharge follow-up  Z09 V67.59   2. Pneumonia of left lung due to infectious organism, unspecified part of lung  J18.9 486   3. Prediabetes  R73.03 790.29   4. Morbid obesity with BMI of 50.0-59.9, adult  E66.01 278.01    Z68.43 V85.43   5. " Cigarette nicotine dependence without complication  F17.210 305.1   6. GERBER (acute kidney injury)  N17.9 584.9   7. Shortness of breath  R06.02 786.05   8. Chronic pain syndrome  G89.4 338.4   9. Myalgia  M79.10 729.1   10. Hypersomnolence  G47.10 780.54   11. Apneic spell  R06.81 786.03   12. Encounter for screening mammogram for breast cancer  Z12.31 V76.12   13. Seasonal allergic rhinitis, unspecified trigger  J30.2 477.9   14. Encounter for wellness examination  Z00.00 V70.0   15. Encounter for lipid screening for cardiovascular disease  Z13.220 V77.91    Z13.6 V81.2   16. Need for hepatitis C screening test  Z11.59 V73.89   17. Encounter for screening for HIV  Z11.4 V73.89       1. Hospital discharge follow-up    2. Pneumonia of left lung due to infectious organism, unspecified part of lung  Overview:  Repeat cxr in 6-8 wks    Orders:  -     X-Ray Chest PA And Lateral; Future; Expected date: 09/03/2024    3. Prediabetes  Overview:  A1c 5.8. She is morbidly obese with a bmi of 53. She is on the keto diet    Adhere to a low carb/sugar diet that is also low in fat, but high in protein. Eat foods such as baked chicken, turkey, low fat ground beef, fish. Increase portions of vegetables. Avoid soft drinks, sweet drinks and stick to mainly water.     Will monitor    Orders:  -     CBC Auto Differential; Future; Expected date: 07/23/2024  -     Comprehensive Metabolic Panel; Future; Expected date: 07/23/2024  -     Hemoglobin A1C; Future; Expected date: 07/23/2024    4. Morbid obesity with BMI of 50.0-59.9, adult  Overview:  Patient is doing keto diet. Has lost 5 lbs.     Refer to obesity medicine clinic    Orders:  -     Complete PFT with bronchodilator; Future  -     Cancel: Ambulatory referral/consult to Sleep Disorders; Future; Expected date: 07/30/2024  -     Ambulatory referral/consult to Bariatric/Obesity Medicine; Future; Expected date: 07/23/2024  -     Ambulatory referral/consult to Sleep Disorders; Future;  Expected date: 07/23/2024    5. Cigarette nicotine dependence without complication  Overview:  Smokes 1 ppd since the age of 6. Smoking cessation encouraged    Will consider smoking cessation in the future      6. GERBER (acute kidney injury)    7. Shortness of breath  -     Complete PFT with bronchodilator; Future    8. Chronic pain syndrome    9. Myalgia  -     CK; Future; Expected date: 07/23/2024  -     TSH; Future; Expected date: 07/23/2024  -     Urinalysis; Future; Expected date: 07/23/2024  -     CRP, High Sensitivity; Future; Expected date: 07/23/2024  -     Lupus Comprehensive Panel; Future; Expected date: 07/23/2024  -     Vitamin B12; Future; Expected date: 07/23/2024  -     Uric Acid; Future; Expected date: 07/23/2024  -     Rheumatoid Factors, IgA, IgG, IgM; Future; Expected date: 07/23/2024  -     Cyclic Citrullinated Peptide Antibody, IgG; Future; Expected date: 07/23/2024    10. Hypersomnolence  -     Cancel: Ambulatory referral/consult to Sleep Disorders; Future; Expected date: 07/30/2024  -     Vitamin B12; Future; Expected date: 07/23/2024  -     Ambulatory referral/consult to Sleep Disorders; Future; Expected date: 07/23/2024    11. Apneic spell  -     Cancel: Ambulatory referral/consult to Sleep Disorders; Future; Expected date: 07/30/2024  -     Ambulatory referral/consult to Sleep Disorders; Future; Expected date: 07/23/2024    12. Encounter for screening mammogram for breast cancer  -     Mammo Digital Screening Bilat w/ Flaquito; Future; Expected date: 07/23/2024    13. Seasonal allergic rhinitis, unspecified trigger  -     fluticasone propionate (FLONASE) 50 mcg/actuation nasal spray; 1 spray (50 mcg total) by Each Nostril route 2 (two) times a day.  Dispense: 16 g; Refill: 11    14. Encounter for wellness examination  -     CBC Auto Differential; Future; Expected date: 07/23/2024  -     Comprehensive Metabolic Panel; Future; Expected date: 07/23/2024  -     Lipid Panel; Future; Expected date:  07/23/2024  -     TSH; Future; Expected date: 07/23/2024  -     Hemoglobin A1C; Future; Expected date: 07/23/2024  -     Urinalysis; Future; Expected date: 07/23/2024    15. Encounter for lipid screening for cardiovascular disease  -     Lipid Panel; Future; Expected date: 07/23/2024    16. Need for hepatitis C screening test  -     Hepatitis C Antibody; Future; Expected date: 07/23/2024    17. Encounter for screening for HIV  -     HIV 1/2 Ag/Ab (4th Gen); Future; Expected date: 07/23/2024       Start flonase bid  Will get updated labs  Repeat cxr in 6-8 wks for resolution of pneumoniua  Refer tos [facility] medicine for hypersomnolence, likely has kin  Refer to obesity medicine clinic  Smoking cessation encouraged  Will work up + ankit and myalgias with rheum labs  PFT ordered for smoking and obesity    Follow up: Follow up for wellness in 8 wks with labs.      Care Plan/Goals: Reviewed   Goals    None

## 2024-08-26 ENCOUNTER — OFFICE VISIT (OUTPATIENT)
Dept: NEUROLOGY | Facility: CLINIC | Age: 44
End: 2024-08-26
Payer: COMMERCIAL

## 2024-08-26 ENCOUNTER — PROCEDURE VISIT (OUTPATIENT)
Dept: RESPIRATORY THERAPY | Facility: HOSPITAL | Age: 44
End: 2024-08-26
Attending: FAMILY MEDICINE
Payer: COMMERCIAL

## 2024-08-26 VITALS — WEIGHT: 293 LBS | BODY MASS INDEX: 56.38 KG/M2

## 2024-08-26 DIAGNOSIS — E66.01 MORBID OBESITY WITH BMI OF 50.0-59.9, ADULT: ICD-10-CM

## 2024-08-26 DIAGNOSIS — G47.30 SLEEP APNEA, UNSPECIFIED TYPE: Primary | ICD-10-CM

## 2024-08-26 DIAGNOSIS — R06.02 SHORTNESS OF BREATH: ICD-10-CM

## 2024-08-26 LAB
DLCO ADJ PRE: 20.66 ML/(MIN*MMHG) (ref 21.02–32.49)
DLCO SINGLE BREATH LLN: 21.02
DLCO SINGLE BREATH PRE REF: 77.2 %
DLCO SINGLE BREATH REF: 26.76
DLCOC SBVA LLN: 3.54
DLCOC SBVA PRE REF: 84.1 %
DLCOC SBVA REF: 4.93
DLCOC SINGLE BREATH LLN: 21.02
DLCOC SINGLE BREATH PRE REF: 77.2 %
DLCOC SINGLE BREATH REF: 26.76
DLCOVA LLN: 3.54
DLCOVA PRE REF: 84.1 %
DLCOVA PRE: 4.14 ML/(MIN*MMHG*L) (ref 3.54–6.31)
DLCOVA REF: 4.93
DLVAADJ PRE: 4.14 ML/(MIN*MMHG*L) (ref 3.54–6.31)
ERV LLN: -16448.94
ERV PRE REF: 11.9 %
ERV REF: 1.06
FEF 25 75 LLN: 2.12
FEF 25 75 PRE REF: 76.9 %
FEF 25 75 REF: 3.52
FEV1 FVC LLN: 70
FEV1 FVC PRE REF: 99.2 %
FEV1 FVC REF: 81
FEV1 LLN: 2.48
FEV1 PRE REF: 95.4 %
FEV1 REF: 3.15
FRCPLETH LLN: 2.03
FRCPLETH PREREF: 70.3 %
FRCPLETH REF: 2.85
FVC LLN: 3.11
FVC PRE REF: 95.5 %
FVC REF: 3.93
IVC PRE: 3.54 L (ref 3.11–4.76)
IVC SINGLE BREATH LLN: 3.11
IVC SINGLE BREATH PRE REF: 89.9 %
IVC SINGLE BREATH REF: 3.93
MVV LLN: 102
MVV PRE REF: 88.6 %
MVV REF: 120
PEF LLN: 5.47
PEF PRE REF: 108.6 %
PEF REF: 7.34
PRE DLCO: 20.66 ML/(MIN*MMHG) (ref 21.02–32.49)
PRE ERV: 0.13 L (ref -16448.94–16451.06)
PRE FEF 25 75: 2.7 L/S (ref 2.12–4.91)
PRE FET 100: 6.79 SEC
PRE FEV1 FVC: 80.11 % (ref 69.81–91.69)
PRE FEV1: 3.01 L (ref 2.48–3.82)
PRE FRC PL: 2.01 L
PRE FVC: 3.76 L (ref 3.11–4.76)
PRE MVV: 106 L/MIN (ref 101.66–137.54)
PRE PEF: 7.97 L/S (ref 5.47–9.21)
PRE RV: 1.81 L (ref 1.22–2.37)
PRE TLC: 5.57 L (ref 4.44–6.42)
RAW LLN: 3.06
RAW PRE REF: 117.8 %
RAW PRE: 3.6 CMH2O*S/L (ref 3.06–3.06)
RAW REF: 3.06
RV LLN: 1.22
RV PRE REF: 100.9 %
RV REF: 1.8
RVTLC LLN: 25
RVTLC PRE REF: 95 %
RVTLC PRE: 32.56 % (ref 24.67–43.85)
RVTLC REF: 34
TLC LLN: 4.44
TLC PRE REF: 102.6 %
TLC REF: 5.43
VA PRE: 4.99 L (ref 5.28–5.28)
VA SINGLE BREATH LLN: 5.28
VA SINGLE BREATH PRE REF: 94.6 %
VA SINGLE BREATH REF: 5.28
VC LLN: 3.11
VC PRE REF: 95.5 %
VC PRE: 3.76 L (ref 3.11–4.76)
VC REF: 3.93
VTGRAWPRE: 3.02 L

## 2024-08-26 PROCEDURE — 1159F MED LIST DOCD IN RCRD: CPT | Mod: CPTII,95,, | Performed by: NURSE PRACTITIONER

## 2024-08-26 PROCEDURE — 94729 DIFFUSING CAPACITY: CPT

## 2024-08-26 PROCEDURE — 99203 OFFICE O/P NEW LOW 30 MIN: CPT | Mod: 95,,, | Performed by: NURSE PRACTITIONER

## 2024-08-26 PROCEDURE — 94726 PLETHYSMOGRAPHY LUNG VOLUMES: CPT

## 2024-08-26 PROCEDURE — 3044F HG A1C LEVEL LT 7.0%: CPT | Mod: CPTII,95,, | Performed by: NURSE PRACTITIONER

## 2024-08-26 PROCEDURE — 99900035 HC TECH TIME PER 15 MIN (STAT)

## 2024-08-26 PROCEDURE — 3008F BODY MASS INDEX DOCD: CPT | Mod: CPTII,95,, | Performed by: NURSE PRACTITIONER

## 2024-08-26 PROCEDURE — 99900031 HC PATIENT EDUCATION (STAT)

## 2024-08-26 PROCEDURE — 94010 BREATHING CAPACITY TEST: CPT

## 2024-08-26 PROCEDURE — 1160F RVW MEDS BY RX/DR IN RCRD: CPT | Mod: CPTII,95,, | Performed by: NURSE PRACTITIONER

## 2024-08-26 RX ORDER — ALBUTEROL SULFATE 0.83 MG/ML
2.5 SOLUTION RESPIRATORY (INHALATION)
Status: DISPENSED | OUTPATIENT
Start: 2024-08-26

## 2024-08-26 RX ORDER — FLUTICASONE PROPIONATE 50 MCG
1 SPRAY, SUSPENSION (ML) NASAL DAILY PRN
COMMUNITY

## 2024-08-26 NOTE — PROGRESS NOTES
Neurology Telemedicine Note  Patient treated using real-time Audio/Video, according to [facility] protocols  The patient (or their representative) stated that they understood & accepted the privacy/security risks to their info at their location.  Patient participated in the visit at a non-[facility] location selected by themself, or their representative.  Susan HERNANDEZ NP, conducted the visit from the [facility] & am licensed in the state [facility], which is where the pt is currently located    CC: NP referred by Dr. Adriana Chavez    HPI:   Snores (for years)  EDS  +witnessed apneas  Would nap if she could  Not rested when she awakens in the morning  Nocturia x2  RLS most nights - not on meds    +migraines; barometric   Sometimes wakes up with occipital headache    Had PFTs done this morning to r/o copd  Had PNA a few weeks ago        7/23/2024     8:10 AM   EPWORTH SLEEPINESS SCALE   Sitting and reading 1   Watching TV 1   Sitting, inactive in a public place (e.g. a theatre or a meeting) 0   As a passenger in a car for an hour without a break 3   Lying down to rest in the afternoon when circumstances permit 3   Sitting and talking to someone 2   Sitting quietly after a lunch without alcohol 3   In a car, while stopped for a few minutes in traffic 0   Total score 13     ROS:  A 14pt ROS was reviewed & is negative unless otherwise documented in the HPI    OBJECTIVE:  GENERAL: NAD, calm, cooperative, appropriate  RESP: CTAB  HEART: RRR  no LE edema  MENTAL STATUS: Oriented x4, follows commands reliably  SPEECH/LANGUAGE: Clear, coherent  gaze conjugate  No tactile or motor facial asymmetry  t/p midline  Motor: No focal weakness  Cerebellar: No tremor or dysmetria    f2f time spent w/ pt exceeds 12 min, over 50% of which was used for education & counseling regarding medical conditions, current medications including risk/benefit & side effect/adverse events, otc meds - uses/doses, home self-care & contact  precautions; red flags & indications for immediate medical attention. the patient is receptive, expresses understanding and is agreeable to the plan. All questions answered.     SLEEP TESTING:  pending    Problem List Items Addressed This Visit          Other    Sleep apnea - Primary    Relevant Orders    Home Sleep Study       PLAN:  Get HST  F/u p testing      Susan Vaca, [facility]

## 2024-08-26 NOTE — PATIENT INSTRUCTIONS
"   Sleep Apnea in Adults   The Basics   Written by the doctors and editors at [facility]   What is sleep apnea? -- Sleep apnea is a condition that makes you stop breathing for short periods while you are asleep. There are 2 types of sleep apnea. One is called "obstructive sleep apnea," and the other is called "central sleep apnea."  In obstructive sleep apnea, you stop breathing because your throat narrows or closes (figure 1). In central sleep apnea, you stop breathing because your brain does not send the right signals to your muscles to make you breathe. When people talk about sleep apnea, they are usually referring to obstructive sleep apnea, which is what this article is about.  People with sleep apnea do not know that they stop breathing when they are asleep. But they do sometimes wake up startled or gasping for breath. They also often hear from loved ones that they snore.  What are the symptoms of sleep apnea? -- The main symptoms of sleep apnea are loud snoring, tiredness, and daytime sleepiness. Other symptoms can include:  Restless sleep  Waking up choking or gasping  Morning headaches, dry mouth, or sore throat  Waking up often to urinate  Waking up feeling unrested or groggy  Trouble thinking clearly or remembering things  Some people with sleep apnea don't have symptoms, or they don't know they have them. They might figure that it's normal to be tired or to snore a lot.  Should I see a doctor or nurse? -- Yes. If you think you might have sleep apnea, see your doctor.  Is there a test for sleep apnea? -- Yes. If your doctor or nurse suspects you have sleep apnea, they might send you for a "sleep study." Sleep studies can sometimes be done at home, but they are usually done in a sleep lab. For the study, you spend the night in the lab, and you are hooked up to different machines that monitor your heart rate, breathing, and other body functions. The results of the test will tell your doctor or nurse if you " "have the disorder.  Is there anything I can do on my own to help my sleep apnea? -- Yes. Here are some things that might help:  Stay off your back when sleeping. (This is not always practical, because people cannot control their position while asleep. Plus, it only helps some people.)  Lose weight, if you are overweight  Avoid alcohol, because it can make sleep apnea worse  How is sleep apnea treated? -- As mentioned above, weight loss can help if you are overweight or obese. But losing weight can be challenging, and it takes time to lose enough weight to help with your sleep apnea. Most people need other treatment while they work on losing weight.  The most effective treatment for sleep apnea is a device that keeps your airway open while you sleep. Treatment with this device is called "continuous positive airway pressure," or CPAP. People getting CPAP wear a face mask at night that keeps them breathing (figure 2).  If your doctor or nurse recommends a CPAP machine, try to be patient about using it. The mask might seem uncomfortable to wear at first, and the machine might seem noisy, but using the machine can really pay off. People with sleep apnea who use a CPAP machine feel more rested and generally feel better.  There is also another device that you wear in your mouth called an "oral appliance" or "mandibular advancement device." It also helps keep your airway open while you sleep. But devices do not work as well as CPAP for treating sleep apnea.  In rare cases, when nothing else helps, doctors recommend surgery to keep the airway open. Surgery to do this is not always effective, and even when it is, the problem can come back.  Is sleep apnea dangerous? -- It can be. People with sleep apnea do not get good-quality sleep, so they are often tired and not alert. This puts them at risk for car accidents and other types of accidents. Plus, studies show that people with sleep apnea are more likely than others to have " high blood pressure, heart attacks, and other serious heart problems. In people with severe sleep apnea, getting treated (for example, with a CPAP machine) can help prevent some of these problems.  All topics are updated as new evidence becomes available and our peer review process is complete.  This topic retrieved from [employer] on: Sep 21, 2021.  Topic 53194 Version 12.0  Release: 29.4.2 - C29.263  © 2021 UpToDate, Inc. and/or its affiliates. All rights reserved.  figure 1: Airway in a person with sleep apnea     Normally when a person sleeps, the airway remains open, and air can pass from the nose and mouth to the lungs. In a person with sleep apnea, parts of the throat and mouth drop into the airway and block off the flow of air. This can cause loud snoring and interrupt breathing for short periods.  Graphic 73095 Version 5.0    figure 2: Continuous positive airway pressure (CPAP) for sleep apnea     The CPAP mask gently blows air into your nose while you sleep. It puts just enough pressure on your airway to keep it from closing. The mask in this picture fits over just the nose. Other CPAP devices have masks that fit over the nose and mouth.  Graphic 46813 Version 5.0    Consumer Information Use and Disclaimer   This information is not specific medical advice and does not replace information you receive from your health care provider. This is only a brief summary of general information. It does NOT include all information about conditions, illnesses, injuries, tests, procedures, treatments, therapies, discharge instructions or life-style choices that may apply to you. You must talk with your health care provider for complete information about your health and treatment options. This information should not be used to decide whether or not to accept your health care provider's advice, instructions or recommendations. Only your health care provider has the knowledge and training to provide advice that is right for  you. The use of this information is governed by the [employer] End User License Agreement, available at https://www.[employer]Fwd: Power/en/solutions/[employer]ir[employer]/about/agata.The use of [employer] content is governed by the [employer] Terms of Use. ©2021 UpToDate, Inc. All rights reserved.  Copyright   © 2021 UpToDate, Inc. and/or its affiliates. All rights reserved.

## 2024-08-30 ENCOUNTER — TELEPHONE (OUTPATIENT)
Dept: FAMILY MEDICINE | Facility: CLINIC | Age: 44
End: 2024-08-30
Payer: COMMERCIAL

## 2024-08-30 NOTE — TELEPHONE ENCOUNTER
Spoke to pt and notified her that the office was calling to let her know that the PFT is normal. Verbal understanding given

## 2024-08-30 NOTE — TELEPHONE ENCOUNTER
----- Message from Nathaniel Timmons sent at 8/30/2024  1:02 PM CDT -----  Regarding: return call  Who Called: Yoli Saldaña    Patient is returning phone call    Who Left Message for Patient:unknown  Does the patient know what this is regarding?:      Preferred Method of Contact: Phone Call  Patient's Preferred Phone Number on File: [phone]   Best Call Back Number, if different:  Additional Information: Pt states she has a missed call from clinic. #nothing in chart

## 2024-09-19 ENCOUNTER — HOSPITAL ENCOUNTER (OUTPATIENT)
Dept: RADIOLOGY | Facility: HOSPITAL | Age: 44
Discharge: HOME | End: 2024-09-19
Attending: FAMILY MEDICINE
Payer: COMMERCIAL

## 2024-09-19 DIAGNOSIS — J18.9 PNEUMONIA OF LEFT LUNG DUE TO INFECTIOUS ORGANISM, UNSPECIFIED PART OF LUNG: ICD-10-CM

## 2024-09-19 PROCEDURE — 71046 X-RAY EXAM CHEST 2 VIEWS: CPT | Mod: TC

## 2024-09-23 DIAGNOSIS — M79.10 MYALGIA: Primary | ICD-10-CM

## 2024-09-24 ENCOUNTER — PROCEDURE VISIT (OUTPATIENT)
Dept: SLEEP MEDICINE | Facility: HOSPITAL | Age: 44
End: 2024-09-24
Attending: NURSE PRACTITIONER
Payer: COMMERCIAL

## 2024-09-24 DIAGNOSIS — G47.30 SLEEP APNEA, UNSPECIFIED TYPE: ICD-10-CM

## 2024-09-24 DIAGNOSIS — G47.33 OBSTRUCTIVE SLEEP APNEA: Primary | ICD-10-CM

## 2024-09-24 PROCEDURE — G0399 HOME SLEEP TEST/TYPE 3 PORTA: HCPCS | Mod: 26,,, | Performed by: PSYCHIATRY & NEUROLOGY

## 2024-09-28 ENCOUNTER — PATIENT MESSAGE (OUTPATIENT)
Dept: ADMINISTRATIVE | Facility: HOSPITAL | Age: 44
End: 2024-09-28
Payer: COMMERCIAL

## 2024-09-29 DIAGNOSIS — Z12.11 SCREENING FOR COLON CANCER: ICD-10-CM

## 2024-09-30 ENCOUNTER — TELEPHONE (OUTPATIENT)
Dept: FAMILY MEDICINE | Facility: CLINIC | Age: 44
End: 2024-09-30
Payer: COMMERCIAL

## 2024-09-30 NOTE — TELEPHONE ENCOUNTER
Patient reschedule her wellness for klaudia. Please move it up to the next 6 wks so we can discuss labs

## 2024-10-01 NOTE — TELEPHONE ENCOUNTER
----- Message from Yessica sent at 10/1/2024  9:59 AM CDT -----  I left message for patient to call office and I will try to schedule her appt for Oct 9 at 11;30  thank you  ----- Message -----  From: Jessica Michelle LPN  Sent: 10/1/2024   9:26 AM CDT  To: [facility]  Staff      ----- Message -----  From: Gianna Abdullahi  Sent: 10/1/2024   8:40 AM CDT  To: [facility] Staff    Who Called: Yoli Saldaña    Patient is returning phone call    Who Left Message for Patient:Yessica  Does the patient know what this is regarding?:    Patient's Preferred Phone Number on File: [phone]   Best Call Back Number, if different:  Additional Information: no avail within time frame req by provider, please follow up to r/s. Pt req call between 1-2 (pt lunch hour)

## 2024-10-14 ENCOUNTER — PROCEDURE VISIT (OUTPATIENT)
Dept: SLEEP MEDICINE | Facility: HOSPITAL | Age: 44
End: 2024-10-14
Attending: PSYCHIATRY & NEUROLOGY
Payer: COMMERCIAL

## 2024-10-14 DIAGNOSIS — G47.33 OBSTRUCTIVE SLEEP APNEA: ICD-10-CM

## 2024-10-14 PROCEDURE — 95811 POLYSOM 6/>YRS CPAP 4/> PARM: CPT | Mod: 26,,, | Performed by: PSYCHIATRY & NEUROLOGY

## 2024-10-14 PROCEDURE — 95811 POLYSOM 6/>YRS CPAP 4/> PARM: CPT

## 2024-10-17 ENCOUNTER — HOSPITAL ENCOUNTER (OUTPATIENT)
Dept: RADIOLOGY | Facility: HOSPITAL | Age: 44
Discharge: HOME | End: 2024-10-17
Attending: FAMILY MEDICINE
Payer: COMMERCIAL

## 2024-10-17 DIAGNOSIS — Z12.31 ENCOUNTER FOR SCREENING MAMMOGRAM FOR BREAST CANCER: ICD-10-CM

## 2024-10-17 PROCEDURE — 77067 SCR MAMMO BI INCL CAD: CPT | Mod: TC

## 2024-10-28 PROBLEM — J18.9 PNEUMONIA OF LEFT LUNG DUE TO INFECTIOUS ORGANISM: Status: RESOLVED | Noted: 2024-07-10 | Resolved: 2024-10-28

## 2024-10-29 PROBLEM — G47.33 OSA ON CPAP: Status: ACTIVE | Noted: 2024-08-26

## 2024-10-30 ENCOUNTER — OFFICE VISIT (OUTPATIENT)
Dept: NEUROLOGY | Facility: CLINIC | Age: 44
End: 2024-10-30
Payer: COMMERCIAL

## 2024-10-30 DIAGNOSIS — G47.33 OSA ON CPAP: Primary | ICD-10-CM

## 2024-10-30 PROCEDURE — 99213 OFFICE O/P EST LOW 20 MIN: CPT | Mod: 95,,, | Performed by: NURSE PRACTITIONER

## 2024-10-30 PROCEDURE — 1159F MED LIST DOCD IN RCRD: CPT | Mod: CPTII,95,, | Performed by: NURSE PRACTITIONER

## 2024-10-30 PROCEDURE — 3044F HG A1C LEVEL LT 7.0%: CPT | Mod: CPTII,95,, | Performed by: NURSE PRACTITIONER

## 2024-10-30 PROCEDURE — 1160F RVW MEDS BY RX/DR IN RCRD: CPT | Mod: CPTII,95,, | Performed by: NURSE PRACTITIONER

## 2024-10-31 ENCOUNTER — PATIENT MESSAGE (OUTPATIENT)
Dept: ADMINISTRATIVE | Facility: HOSPITAL | Age: 44
End: 2024-10-31
Payer: COMMERCIAL

## 2024-11-07 ENCOUNTER — OFFICE VISIT (OUTPATIENT)
Dept: FAMILY MEDICINE | Facility: CLINIC | Age: 44
End: 2024-11-07
Payer: COMMERCIAL

## 2024-11-07 VITALS
TEMPERATURE: 99 F | OXYGEN SATURATION: 97 % | WEIGHT: 293 LBS | HEART RATE: 73 BPM | DIASTOLIC BLOOD PRESSURE: 87 MMHG | HEIGHT: 67 IN | RESPIRATION RATE: 20 BRPM | BODY MASS INDEX: 45.99 KG/M2 | SYSTOLIC BLOOD PRESSURE: 131 MMHG

## 2024-11-07 DIAGNOSIS — Z00.00 ENCOUNTER FOR WELLNESS EXAMINATION: Primary | ICD-10-CM

## 2024-11-07 DIAGNOSIS — F17.210 CIGARETTE NICOTINE DEPENDENCE WITHOUT COMPLICATION: ICD-10-CM

## 2024-11-07 DIAGNOSIS — M79.10 MYALGIA: ICD-10-CM

## 2024-11-07 DIAGNOSIS — R76.8 ANA POSITIVE: ICD-10-CM

## 2024-11-07 DIAGNOSIS — G47.33 OSA ON CPAP: ICD-10-CM

## 2024-11-07 DIAGNOSIS — R73.03 PREDIABETES: ICD-10-CM

## 2024-11-07 DIAGNOSIS — M25.50 POLYARTHRALGIA: ICD-10-CM

## 2024-11-07 DIAGNOSIS — E83.52 HYPERCALCEMIA: ICD-10-CM

## 2024-11-07 DIAGNOSIS — E66.01 MORBID OBESITY WITH BMI OF 50.0-59.9, ADULT: ICD-10-CM

## 2024-11-07 RX ORDER — GABAPENTIN 300 MG/1
300 CAPSULE ORAL 3 TIMES DAILY
Qty: 90 CAPSULE | Refills: 11 | Status: SHIPPED | OUTPATIENT
Start: 2024-11-07 | End: 2025-11-07

## 2024-11-07 NOTE — PROGRESS NOTES
Patient ID: [id number]     Chief Complaint: Annual Exam (Wellness with lab results)        HPI:     Yoli Saldaña is a 44 y.o. female here today for an annual wellness visit. Reviewed and discussed lab results. Overall she feels well. No other complaints today. Denies depression, si/hi, melena, hematochezia.     As a separate em visit, she c/o years muscle pain and joint pain. SEBASTIAN and CRP + but RF, ccp, cpk neg. Used to be on gabapentin, which helped slightly. sTates simply touching her muscles causes pain. This limits her mobility and ability to exercise. No neuropathy.       -------------------------------------    Allergy        Past Surgical History:   Procedure Laterality Date    COSMETIC SURGERY  1998    Facial reconstruction    DENTAL SURGERY      EYE SURGERY  1986    HYSTERECTOMY  2018    TUBAL LIGATION  2000       Review of patient's allergies indicates:   Allergen Reactions    Latex, natural rubber Hives, Itching and Rash    Codeine     Tramadol      Other reaction(s): Not Indicated       Outpatient Medications Marked as Taking for the 11/7/24 encounter (Office Visit) with Adriana Chavez MD   Medication Sig Dispense Refill    cetirizine (ZYRTEC) 10 MG tablet Take 1 tablet by mouth every morning.      fluticasone propionate (FLONASE) 50 mcg/actuation nasal spray 1 spray by Each Nostril route daily as needed for Rhinitis.       Current Facility-Administered Medications for the 11/7/24 encounter (Office Visit) with Adriana Chavez MD   Medication Dose Route Frequency Provider Last Rate Last Admin    albuterol nebulizer solution 2.5 mg  2.5 mg Nebulization 1 time in Clinic/HOD Adriana Chavez MD           Social History     Socioeconomic History    Marital status: Single   Tobacco Use    Smoking status: Every Day     Current packs/day: 0.50     Average packs/day: 0.5 packs/day for 30.0 years (15.0 ttl pk-yrs)     Types: Cigarettes    Smokeless tobacco: Never   Substance and Sexual  Activity    Alcohol use: Not Currently     Comment: No drink in over 2 years    Drug use: Not Currently     Types: Cocaine, Codeine, LSD, Marijuana    Sexual activity: Not Currently     Partners: Female     Birth control/protection: None     Social Drivers of Health     Financial Resource Strain: High Risk (11/7/2024)    Overall Financial Resource Strain (CARDIA)     Difficulty of Paying Living Expenses: Very hard   Food Insecurity: Unknown (11/7/2024)    Hunger Vital Sign     Worried About Running Out of Food in the Last Year: Patient declined     Ran Out of Food in the Last Year: Never true   Transportation Needs: No Transportation Needs (11/7/2024)    TRANSPORTATION NEEDS     Transportation : No   Physical Activity: Inactive (11/7/2024)    Exercise Vital Sign     Days of Exercise per Week: 0 days     Minutes of Exercise per Session: 0 min   Stress: Stress Concern Present (11/7/2024)    [employer] - Occupational Stress Questionnaire     Feeling of Stress : To some extent   Housing Stability: High Risk (11/7/2024)    Housing Stability Vital Sign     Unable to Pay for Housing in the Last Year: Yes        Family History   Problem Relation Name Age of Onset    Alcohol abuse Mother Jeannette     Cancer Mother Jeannette     Depression Mother Jeannette     Miscarriages / Stillbirths Mother Jeannette     Alcohol abuse Father Brent     Depression Sister Debbie     Miscarriages / Stillbirths Daughter Ciera     Depression Son Ahmet     Arthritis Maternal Aunt Taylor     Depression Maternal Uncle Brian     Arthritis Maternal Grandmother Soumya     Cancer Maternal Grandfather Dustin         Patient Care Team:  Adriana Chavez MD as PCP - General (Family Medicine)       Subjective:     ROS    Constitutional: Denies Change in appetite. Denies Chills. Denies Fever. Denies Night sweats.  Eye: Denies changes in vision  ENT: Denies Decreased hearing. Denies Sore throat. Denies Swollen  "glands.  Respiratory: Denies Cough. Denies Shortness of breath. Denies Shortness of breath with exertion. Denies Wheezing.  Cardiovascular: DeniesChest pain at rest. Denies Chest pain with exertion. Denies Irregular heartbeat. Denies Palpitations. Denies Edema.  Gastrointestinal: Denies Abdominal pain. DeniesDiarrhea. Denies Nausea. Denies Vomiting. Denies Hematemesis or Hematochezia.  Genitourinary: Denies Dysuria. Denies Urinary frequency. Denies Urinary urgency. Denies Blood in urine.  Endocrine: Denies Cold intolerance. Denies Excessive thirst. Denies Heat intolerance. Denies Weight loss. Denies Weight gain.  Musculoskeletal: Denies Painful joints. Denies Weakness.  Integumentary: Denies Rash. Denies Itching. Denies Dry skin.  Neurologic: Denies Dizziness. Denies Fainting. Denies Headache.  Psychiatric: Denies Depression. Denies Anxiety. Denies Suicidal/Homicidal ideations.    All Other ROS: Negative except as stated in HPI.       Objective:     /87 (BP Location: Left arm, Patient Position: Sitting)   Pulse 73   Temp 98.5 °F (36.9 °C) (Oral)   Resp 20   Ht 5' 7" (1.702 m)   Wt (!) 161.8 kg (356 lb 11.2 oz)   SpO2 97%   BMI 55.87 kg/m²     Physical Exam    General: Alert and oriented, No acute distress. Morbid obesity  Head: Normocephalic, Atraumatic.  Eye: Pupils are equal, round and reactive to light, Extraocular movements are intact, Sclera non-icteric.  Ears/Nose/Throat: TM+ light reflexes bilaterally. Normal, Mucosa moist,Clear. Teeth intact, no lesions of tongue, palate, mucosa.  Respiratory: Clear to auscultation bilaterally; No wheezes, rales or rhonchi, Non-labored respirations, Symmetrical chest wall expansion.  Cardiovascular: Regular rate and rhythm, S1/S2 normal, No murmurs, rubs or gallops.  Gastrointestinal: Soft, Non-tender, Non-distended, Normal bowel sounds, No palpable organomegaly.  Integumentary: Warm, Dry, Intact, No suspicious lesions or rashes.  Extremities: No clubbing, " cyanosis or edema  Psychiatric: Normal interaction, Coherent speech, Euthymic mood, Appropriate affect       Assessment:     Problem List Items Addressed This Visit       Prediabetes    Overview     A1c down from 5.8 to 5.4 . She is morbidly obese with a bmi of 55. She is on the keto diet    Adhere to a low carb/sugar diet that is also low in fat, but high in protein. Eat foods such as baked chicken, turkey, low fat ground beef, fish. Increase portions of vegetables. Avoid soft drinks, sweet drinks and stick to mainly water.     Will monitor         Relevant Orders    CBC Auto Differential    Comprehensive Metabolic Panel    Lipid Panel    TSH    Hemoglobin A1C    Urinalysis    Vitamin D    Morbid obesity with BMI of 50.0-59.9, adult    Overview     Patient is doing keto diet. Has lost 5 lbs. She walks 30 min 3x per week.    - exercise for 30-45 min a day, 5x a week  - eat a total of 1500 calories daily with less than 70 total carbohydrates daily  - use my fitness pal to log foods and track calories  - eat lean meats like chicken, turkey, fish, lean ground beef. Avoid fried, fatty or processed foods.  - do not eat pasta, bread, rice, potatoes often           Relevant Orders    CBC Auto Differential    Comprehensive Metabolic Panel    Lipid Panel    TSH    Hemoglobin A1C    Urinalysis    Vitamin D    Cigarette nicotine dependence without complication    Overview     Smokes 1 ppd since the age of 6. Smoking cessation encouraged    Will consider smoking cessation in the future         PHI on CPAP    Overview     Diagnosed in 2024. Will be getting a machine          Other Visit Diagnoses       Encounter for wellness examination    -  Primary    Relevant Orders    CBC Auto Differential    Comprehensive Metabolic Panel    Lipid Panel    TSH    Hemoglobin A1C    Urinalysis    Vitamin D    SEBASTIAN positive        Relevant Orders    Ambulatory referral/consult to Rheumatology    Hypercalcemia        Relevant Orders     Comprehensive Metabolic Panel    Vitamin D    Polyarthralgia        Relevant Medications    gabapentin (NEURONTIN) 300 MG capsule    Other Relevant Orders    Ambulatory referral/consult to Rheumatology    Myalgia                Plan:   Reminded patient to turn in cologuard  Refer to rheum for myalgias, polyarthralgia, + SEBASTIAN. Likely has fibromyalgia. Will start gabapentin 300 mg tid prn.   Weight loss encouraged. Declines bariatric surgery at this time  Offered to repeat bmp with vit d for hypercalcemia but she declined    Health Maintenance Topics with due status: Not Due       Topic Last Completion Date    Hemoglobin A1c (Prediabetes) 09/19/2024    Lipid Panel 09/19/2024    Mammogram 10/18/2024    RSV Vaccine (Age 60+ and Pregnant patients) Not Due        Eye Exam - Recommend annually.    Dental Exam - Recommend biannual exams.     Vaccinations -   Immunization History   Administered Date(s) Administered    COVID-19, MRNA, LN-S, PF (Pfizer) (Purple Cap) 03/15/2021, 04/05/2021      Patient was counseled on risks/benefits of receiving immunization. All questions were answered    Perform monthly self breast exams and call me immediately if you find a lump/bump or have any skin/nipple changes  Exercise for a total of 150 min per week and eat a healthy diet  Stay up to date with all cancer screening discussed in visit  Immunizations due were discussed during visit  All health maintenance was reviewed with patient. Patient verbalized understanding. All questions were answered.     Medication List with Changes/Refills   New Medications    GABAPENTIN (NEURONTIN) 300 MG CAPSULE    Take 1 capsule (300 mg total) by mouth 3 (three) times daily.       Start Date: 11/7/2024 End Date: 11/7/2025   Current Medications    CETIRIZINE (ZYRTEC) 10 MG TABLET    Take 1 tablet by mouth every morning.       Start Date: --        End Date: --    FLUTICASONE PROPIONATE (FLONASE) 50 MCG/ACTUATION NASAL SPRAY    1 spray by Each Nostril route  daily as needed for Rhinitis.       Start Date: --        End Date: --          The patient's Health Maintenance was reviewed and the following appears to be due at this time:   Health Maintenance Due   Topic Date Due    TETANUS VACCINE  Never done    Colorectal Cancer Screening  Never done    COVID-19 Vaccine (3 - 2024-25 season) 09/01/2024         Follow up for 1 year wellness with labs. In addition to their scheduled follow up, the patient has also been instructed to follow up on as needed basis.

## 2025-03-23 ENCOUNTER — ON-DEMAND VIRTUAL (OUTPATIENT)
Dept: URGENT CARE | Facility: CLINIC | Age: 45
End: 2025-03-23
Payer: COMMERCIAL

## 2025-03-23 DIAGNOSIS — Z02.89 ENCOUNTER TO OBTAIN EXCUSE FROM WORK: ICD-10-CM

## 2025-03-23 DIAGNOSIS — R19.7 DIARRHEA, UNSPECIFIED TYPE: Primary | ICD-10-CM

## 2025-03-23 PROCEDURE — 98005 SYNCH AUDIO-VIDEO EST LOW 20: CPT | Mod: 95,,, | Performed by: NURSE PRACTITIONER

## 2025-03-23 RX ORDER — DICYCLOMINE HYDROCHLORIDE 20 MG/1
20 TABLET ORAL EVERY 6 HOURS
Qty: 20 TABLET | Refills: 0 | Status: SHIPPED | OUTPATIENT
Start: 2025-03-23 | End: 2025-03-28

## 2025-03-23 NOTE — LETTER
March 23, 2025    Yoli Saldaña  [address] [address]             Virtual Visit - Urgent Care  Urgent Care  [address] [address]   March 23, 2025     Patient: Yoli Saldaña   YOB: 1980   Date of Visit: 3/23/2025       To Whom it May Concern:    Yoli Saldaña was seen virtually on 3/23/2025. She may return to work on 3/25/2025 .    Please excuse her from any classes or work missed.    If you have any questions or concerns, please don't hesitate to call.    Sincerely,           Brittanie Brooks, LEILAP

## 2025-03-23 NOTE — PROGRESS NOTES
Subjective:      Patient ID: Yoli Saldaña is a 44 y.o. female.    Vitals:  vitals were not taken for this visit.     Chief Complaint: Diarrhea      Visit Type: TELE AUDIOVISUAL - This visit was conducted virtually based on  subjective information and limited objective exam    Present with the patient at the time of consultation: TELEMED PRESENT WITH PATIENT: None  LOCATION OF PATIENT [facility]  Two patient identifiers used to verify patient- saying out date of birth and full name.       Past Medical History:   Diagnosis Date    Allergy      Past Surgical History:   Procedure Laterality Date    COSMETIC SURGERY  1998    Facial reconstruction    DENTAL SURGERY      EYE SURGERY  1986    HYSTERECTOMY  2018    TUBAL LIGATION  2000     Review of patient's allergies indicates:   Allergen Reactions    Latex, natural rubber Hives, Itching and Rash    Codeine     Tramadol      Other reaction(s): Not Indicated     Medications Ordered Prior to Encounter[1]  Family History   Problem Relation Name Age of Onset    Alcohol abuse Mother Jeannette     Cancer Mother Jeannette     Depression Mother Jeannette     Miscarriages / Stillbirths Mother Jeannette     Alcohol abuse Father Brent     Depression Sister Debbie     Miscarriages / Stillbirths Daughter Ciera     Depression Son Ahmet     Arthritis Maternal Aunt Taylor     Depression Maternal Uncle Brian     Arthritis Maternal Grandmother Soumya     Cancer Maternal Grandfather Dustin        Medications Ordered                [id number] - [place] - [address] AT [facility] [address]   [address] [place] [address]    Telephone: [phone]   Fax: [phone]   Hours: Not open 24 hours                         E-Prescribed (1 of 1)              dicyclomine (BENTYL) 20 mg tablet    Sig: Take 1 tablet (20 mg total) by mouth every 6 (six) hours. for 5 days       Start: 3/23/25     Quantity: 20 tablet  Refills: 0                           Ohs Peq Odvv Intake    3/23/2025  5:11 PM CDT - Filed by Patient   What is your current physical address in the event of a medical emergency? [address] [place] [address]   Are you able to take your vital signs? No   Please attach any relevant images or files    Is your employer contracted with [facility]? No         43 yo female with c/o nausea and diarrhea for 5 days. She states nausea has improved but still with diarrhea. She denies abdominal pain and fever. She is able to hold down po. She denies abdominal pain . Denies fever.         Constitution: Negative.   HENT: Negative.     Cardiovascular: Negative.    Respiratory: Negative.     Gastrointestinal:  Positive for nausea, vomiting and diarrhea.   Endocrine: negative.   Genitourinary: Negative.  Negative for frequency and urgency.   Musculoskeletal: Negative.    Skin: Negative.    Allergic/Immunologic: Negative.    Neurological: Negative.    Hematologic/Lymphatic: Negative.    Psychiatric/Behavioral: Negative.          Objective:   The physical exam was conducted virtually.    AAO x 3 ; no acute distress noted; appearance normal; mood and behavior normal; thought process normal  Head- normocephalic  Nose- appears normal, no discharge or erythema  Eyes- pupils appear normal in size, no drainage, no erythema  Ears- normal appearing; no discharge, no erythema  Mouth- appears normal  Oropharynx- no erythema, lesions  Lungs- breathing at a normal rate, no acute distress noted  Heart- no reports of tachycardia, palpitations, chest pain  Abdomen- non distended, non tender reported by patient  Skin- warm and dry, no erythema or edema noted by patient or visualized  Psych- as above; no si/hi      Assessment:     1. Diarrhea, unspecified type    2. Encounter to obtain excuse from work        Plan:     Stay Hydrated: Use electrolyte-rich fluids such as Gatorade, Pedialyte, coconut water, or rehydration packets to help  replenish lost fluids and electrolytes. These fluids are more effective at rehydration compared to plain water or vitamin water.  Increase Clear Liquids: Consume clear liquids such as water, Gatorade, Pedialyte, broths, and jello to maintain hydration. It's advisable to hold off on solid foods for 12-18 hours and then gradually advance to the BRAT diet (banana, rice, applesauce, tea, toast/crackers), followed by further food advancement as tolerated.  Use of Pepto-Bismol: Pepto-Bismol can help alleviate symptoms of diarrhea. However, it's important to use it as directed and be aware of any potential side effects. Avoid using Imodium (loperamide) for diarrhea relief.  These recommendations aim to address symptoms of diarrhea and dehydration while providing guidance on fluid and diet management. If symptoms persist or worsen, it's advisable to seek medical attention for further evaluation and treatment. Additionally, it's essential to follow any specific instructions provided by a healthcare professional based on individual health conditions and circumstances.      Thank you for choosing [facility]!    Our goal in the [facility] is to always provide outstanding medical care. You may receive a survey by mail or e-mail in the next week regarding your experience today. We would greatly appreciate you completing and returning the survey. Your feedback provides us with a way to recognize our staff who provide very good care, and it helps us learn how to improve when your experience was below our aspiration of excellence.         We appreciate you trusting us with your medical care. We hope you feel better soon. We will be happy to take care of you for all of your future medical needs.    You must understand that you've received an Urgent Care treatment only and that you may be released before all your medical problems are known or treated. You, the patient, will arrange for follow up  care as instructed.    Follow up with your PCP or specialty clinic as directed in the next 1-2 weeks if not improved or as needed.  You can call [phone] to schedule an appointment with the appropriate provider.    If your condition worsens we recommend that you receive another evaluation in person, with your primary care provider, urgent care or at the emergency room immediately or contact your primary medical clinics after hours call service to discuss your concerns.         Diarrhea, unspecified type  -     dicyclomine (BENTYL) 20 mg tablet; Take 1 tablet (20 mg total) by mouth every 6 (six) hours. for 5 days  Dispense: 20 tablet; Refill: 0    Encounter to obtain excuse from work                         [1]   Current Outpatient Medications on File Prior to Visit   Medication Sig Dispense Refill    cetirizine (ZYRTEC) 10 MG tablet Take 1 tablet by mouth every morning.      fluticasone propionate (FLONASE) 50 mcg/actuation nasal spray 1 spray by Each Nostril route daily as needed for Rhinitis.      gabapentin (NEURONTIN) 300 MG capsule Take 1 capsule (300 mg total) by mouth 3 (three) times daily. 90 capsule 11     Current Facility-Administered Medications on File Prior to Visit   Medication Dose Route Frequency Provider Last Rate Last Admin    albuterol nebulizer solution 2.5 mg  2.5 mg Nebulization 1 time in Clinic/HOD Adriana Chavez MD

## 2025-06-15 ENCOUNTER — ON-DEMAND VIRTUAL (OUTPATIENT)
Dept: URGENT CARE | Facility: CLINIC | Age: 45
End: 2025-06-15
Payer: COMMERCIAL

## 2025-06-15 DIAGNOSIS — R11.2 NAUSEA AND VOMITING, UNSPECIFIED VOMITING TYPE: Primary | ICD-10-CM

## 2025-06-15 DIAGNOSIS — R19.7 DIARRHEA, UNSPECIFIED TYPE: ICD-10-CM

## 2025-06-15 PROCEDURE — 98005 SYNCH AUDIO-VIDEO EST LOW 20: CPT | Mod: 95,,, | Performed by: NURSE PRACTITIONER

## 2025-06-15 RX ORDER — ONDANSETRON 4 MG/1
4 TABLET, ORALLY DISINTEGRATING ORAL EVERY 8 HOURS PRN
Qty: 20 TABLET | Refills: 0 | Status: SHIPPED | OUTPATIENT
Start: 2025-06-15

## 2025-06-15 NOTE — PROGRESS NOTES
Subjective:      Patient ID: Yoli Saldaña is a 44 y.o. female.    Vitals:  vitals were not taken for this visit.     Chief Complaint: Nausea (Emesis; diarrhea)      Visit Type: TELE AUDIOVISUAL - This visit was conducted virtually based on  subjective information and limited objective exam    Present with the patient at the time of consultation: TELEMED PRESENT WITH PATIENT: None  LOCATION OF PATIENT [facility]  Two patient identifiers used to verify patient- saying out date of birth and full name.       Past Medical History:   Diagnosis Date    Allergy      Past Surgical History:   Procedure Laterality Date    COSMETIC SURGERY  1998    Facial reconstruction    DENTAL SURGERY      EYE SURGERY  1986    HYSTERECTOMY  2018    TUBAL LIGATION  2000     Review of patient's allergies indicates:   Allergen Reactions    Latex, natural rubber Hives, Itching and Rash    Codeine     Tramadol      Other reaction(s): Not Indicated     Medications Ordered Prior to Encounter[1]  Family History   Problem Relation Name Age of Onset    Alcohol abuse Mother Jeannette     Cancer Mother Jeannette     Depression Mother Jeannette     Miscarriages / Stillbirths Mother Jeannette     Alcohol abuse Father Brent     Depression Sister Debbie     Miscarriages / Stillbirths Daughter Ciera     Depression Son Ahmet     Arthritis Maternal Aunt Taylor     Depression Maternal Uncle Brian     Arthritis Maternal Grandmother Soumya     Cancer Maternal Grandfather Dustin        Medications Ordered                [id number] - [place] - [address] AT [facility] [address]   [address] [place] [address]    Telephone: [phone]   Fax: [phone]   Hours: Not open 24 hours                         E-Prescribed (1 of 1)              ondansetron (ZOFRAN-ODT) 4 MG TbDL    Sig: Take 1 tablet (4 mg total) by mouth every 8 (eight) hours as needed (nausea).       Start:  6/15/25     Quantity: 20 tablet Refills: 0                           No questionnaires on file.    43 yo female with c/o nausea and vomting and diarrhea for three days. She denies blood in stool. She denies abdominal pain other than cramping and nausea. She denies fever.         Constitution: Negative.   HENT: Negative.     Cardiovascular: Negative.    Respiratory: Negative.     Gastrointestinal:  Positive for nausea, vomiting and diarrhea.   Endocrine: negative.   Genitourinary: Negative.  Negative for frequency and urgency.   Musculoskeletal: Negative.    Skin: Negative.    Allergic/Immunologic: Negative.    Neurological: Negative.    Hematologic/Lymphatic: Negative.    Psychiatric/Behavioral: Negative.          Objective:   The physical exam was conducted virtually.    AAO x 3 ; no acute distress noted; appearance normal; mood and behavior normal; thought process normal  Head- normocephalic  Nose- appears normal, no discharge or erythema  Eyes- pupils appear normal in size, no drainage, no erythema  Ears- normal appearing; no discharge, no erythema  Mouth- appears normal  Oropharynx- no erythema, lesions  Lungs- breathing at a normal rate, no acute distress noted  Heart- no reports of tachycardia, palpitations, chest pain  Abdomen- non distended, non tender reported by patient  Skin- warm and dry, no erythema or edema noted by patient or visualized  Psych- as above; no si/hi      Assessment:     1. Nausea and vomiting, unspecified vomiting type    2. Diarrhea, unspecified type        Plan:     Stay Hydrated: Use electrolyte-rich fluids such as Gatorade, Pedialyte, coconut water, or rehydration packets to help replenish lost fluids and electrolytes. These fluids are more effective at rehydration compared to plain water or vitamin water.  Increase Clear Liquids: Consume clear liquids such as water, Gatorade, Pedialyte, broths, and jello to maintain hydration. It's advisable to hold off on solid foods for 12-18  hours and then gradually advance to the BRAT diet (banana, rice, applesauce, tea, toast/crackers), followed by further food advancement as tolerated.  Use of Pepto-Bismol: Pepto-Bismol can help alleviate symptoms of diarrhea. However, it's important to use it as directed and be aware of any potential side effects. Avoid using Imodium (loperamide) for diarrhea relief.  These recommendations aim to address symptoms of diarrhea and dehydration while providing guidance on fluid and diet management. If symptoms persist or worsen, it's advisable to seek medical attention for further evaluation and treatment. Additionally, it's essential to follow any specific instructions provided by a healthcare professional based on individual health conditions and circumstances.      Thank you for choosing [facility]!    Our goal in the [facility] is to always provide outstanding medical care. You may receive a survey by mail or e-mail in the next week regarding your experience today. We would greatly appreciate you completing and returning the survey. Your feedback provides us with a way to recognize our staff who provide very good care, and it helps us learn how to improve when your experience was below our aspiration of excellence.         We appreciate you trusting us with your medical care. We hope you feel better soon. We will be happy to take care of you for all of your future medical needs.    You must understand that you've received an Urgent Care treatment only and that you may be released before all your medical problems are known or treated. You, the patient, will arrange for follow up care as instructed.    Follow up with your PCP or specialty clinic as directed in the next 1-2 weeks if not improved or as needed.  You can call [phone] to schedule an appointment with the appropriate provider.    If your condition worsens we recommend that you receive another evaluation in person,  with your primary care provider, urgent care or at the emergency room immediately or contact your primary medical clinics after hours call service to discuss your concerns.         Nausea and vomiting, unspecified vomiting type  -     ondansetron (ZOFRAN-ODT) 4 MG TbDL; Take 1 tablet (4 mg total) by mouth every 8 (eight) hours as needed (nausea).  Dispense: 20 tablet; Refill: 0    Diarrhea, unspecified type                         [1]   Current Outpatient Medications on File Prior to Visit   Medication Sig Dispense Refill    cetirizine (ZYRTEC) 10 MG tablet Take 1 tablet by mouth every morning.      fluticasone propionate (FLONASE) 50 mcg/actuation nasal spray 1 spray by Each Nostril route daily as needed for Rhinitis.      gabapentin (NEURONTIN) 300 MG capsule Take 1 capsule (300 mg total) by mouth 3 (three) times daily. 90 capsule 11     Current Facility-Administered Medications on File Prior to Visit   Medication Dose Route Frequency Provider Last Rate Last Admin    albuterol nebulizer solution 2.5 mg  2.5 mg Nebulization 1 time in Clinic/HOD Adriana Chavez MD

## 2025-06-15 NOTE — LETTER
Latrice 15, 2025    Yoli Saldaña  [address] [facility] [address]             Virtual Visit - Urgent Care  Urgent Care  [address] [facility] [address]   Latrice 15, 2025     Patient: Yoli Saldaña   YOB: 1980   Date of Visit: 6/15/2025       To Whom it May Concern:    Yoli Saldaña was seen virtually on 6/15/2025. She may return to work on 6/17/25.    Please excuse her from any classes or work missed.    If you have any questions or concerns, please don't hesitate to call.    Sincerely,           Brittanie Brooks, LEILAP